# Patient Record
Sex: FEMALE | Race: WHITE | URBAN - METROPOLITAN AREA
[De-identification: names, ages, dates, MRNs, and addresses within clinical notes are randomized per-mention and may not be internally consistent; named-entity substitution may affect disease eponyms.]

---

## 2019-06-12 ENCOUNTER — HOSPITAL ENCOUNTER (INPATIENT)
Age: 74
LOS: 13 days | Discharge: NURSING, REHAB OR LONG-TERM CARE | DRG: 949 | End: 2019-06-25
Attending: PHYSICAL MEDICINE & REHABILITATION | Admitting: PHYSICAL MEDICINE & REHABILITATION
Payer: MEDICARE

## 2019-06-12 DIAGNOSIS — S06.9XAA TRAUMATIC BRAIN INJURY OF UNKNOWN INTENT: Primary | ICD-10-CM

## 2019-06-12 PROCEDURE — 1280000000 HC REHAB R&B

## 2019-06-12 RX ORDER — SODIUM CHLORIDE 1000 MG
2 TABLET, SOLUBLE MISCELLANEOUS
Status: DISCONTINUED | OUTPATIENT
Start: 2019-06-13 | End: 2019-06-12 | Stop reason: SDUPTHER

## 2019-06-12 RX ORDER — METOLAZONE 5 MG/1
5 TABLET ORAL 2 TIMES DAILY
Status: DISCONTINUED | OUTPATIENT
Start: 2019-06-12 | End: 2019-06-13

## 2019-06-12 RX ORDER — ALLOPURINOL 100 MG/1
200 TABLET ORAL 2 TIMES DAILY
Status: DISCONTINUED | OUTPATIENT
Start: 2019-06-12 | End: 2019-06-22

## 2019-06-12 RX ORDER — SODIUM BICARBONATE 650 MG/1
650 TABLET ORAL 2 TIMES DAILY
Status: DISCONTINUED | OUTPATIENT
Start: 2019-06-12 | End: 2019-06-25 | Stop reason: HOSPADM

## 2019-06-12 RX ORDER — DILTIAZEM HYDROCHLORIDE 120 MG/1
120 CAPSULE, COATED, EXTENDED RELEASE ORAL DAILY
Status: DISCONTINUED | OUTPATIENT
Start: 2019-06-13 | End: 2019-06-14

## 2019-06-12 RX ORDER — DIGOXIN 125 MCG
125 TABLET ORAL DAILY
Status: DISCONTINUED | OUTPATIENT
Start: 2019-06-13 | End: 2019-06-25 | Stop reason: HOSPADM

## 2019-06-12 RX ORDER — ACETAMINOPHEN 500 MG
500 TABLET ORAL EVERY 6 HOURS SCHEDULED
Status: DISCONTINUED | OUTPATIENT
Start: 2019-06-13 | End: 2019-06-25 | Stop reason: HOSPADM

## 2019-06-12 RX ORDER — BUMETANIDE 1 MG/1
2 TABLET ORAL 2 TIMES DAILY
Status: DISCONTINUED | OUTPATIENT
Start: 2019-06-12 | End: 2019-06-19

## 2019-06-12 RX ORDER — LANOLIN ALCOHOL/MO/W.PET/CERES
400 CREAM (GRAM) TOPICAL DAILY
Status: DISCONTINUED | OUTPATIENT
Start: 2019-06-13 | End: 2019-06-25 | Stop reason: HOSPADM

## 2019-06-12 RX ORDER — PANTOPRAZOLE SODIUM 40 MG/1
40 TABLET, DELAYED RELEASE ORAL
Status: DISCONTINUED | OUTPATIENT
Start: 2019-06-13 | End: 2019-06-25 | Stop reason: HOSPADM

## 2019-06-12 RX ORDER — SODIUM CHLORIDE 1000 MG
3 TABLET, SOLUBLE MISCELLANEOUS
Status: DISCONTINUED | OUTPATIENT
Start: 2019-06-13 | End: 2019-06-13

## 2019-06-12 RX ORDER — FERROUS SULFATE 325(65) MG
325 TABLET ORAL
Status: DISCONTINUED | OUTPATIENT
Start: 2019-06-13 | End: 2019-06-25 | Stop reason: HOSPADM

## 2019-06-12 RX ORDER — OXYCODONE HYDROCHLORIDE 5 MG/1
5 TABLET ORAL 2 TIMES DAILY
Status: DISCONTINUED | OUTPATIENT
Start: 2019-06-12 | End: 2019-06-25 | Stop reason: HOSPADM

## 2019-06-12 RX ORDER — CHOLECALCIFEROL (VITAMIN D3) 50 MCG
2000 TABLET ORAL DAILY
Status: DISCONTINUED | OUTPATIENT
Start: 2019-06-13 | End: 2019-06-22

## 2019-06-12 RX ORDER — ASCORBIC ACID 500 MG
500 TABLET ORAL DAILY
Status: DISPENSED | OUTPATIENT
Start: 2019-06-13 | End: 2019-06-23

## 2019-06-12 RX ORDER — FUROSEMIDE 40 MG/1
80 TABLET ORAL DAILY
Status: DISCONTINUED | OUTPATIENT
Start: 2019-06-13 | End: 2019-06-12 | Stop reason: SDUPTHER

## 2019-06-12 ASSESSMENT — PAIN DESCRIPTION - PAIN TYPE: TYPE: ACUTE PAIN

## 2019-06-12 ASSESSMENT — PAIN DESCRIPTION - LOCATION: LOCATION: BACK

## 2019-06-12 ASSESSMENT — PAIN SCALES - GENERAL: PAINLEVEL_OUTOF10: 5

## 2019-06-12 ASSESSMENT — PAIN DESCRIPTION - ORIENTATION: ORIENTATION: MID

## 2019-06-12 NOTE — LETTER
[facility] of Necessity  For Non-Emergency Transportation By [employer]    Individual Information:  1. Name (Enter the full name of the individual transported) [facility] 2. [place] Medicaid Billing Number-[id number]      3. Address (Individual's home address) [address] [place] [address] [phone], [id number] [address]         Transportation Provider Information  4. Provider Name (Enter the business name of the transportation provider) [facility]     5. [place] Medicaid Provider Number- 7 digits                6. National Provider Identifier (NPI)-10 digits            Certification:  7. Criteria Jose Villeda each reason why transport is being certified as necessary)     During transport, this individual requires:          []       Medical treatment for continuous                            supervision by an EMT          [x]       The administration or regulation of                         oxygen by another person          []       Supervised protective restraint       8. Period Beginning Date (Enter the first date of the certification: 1/47/55 __________________________________  9. Length Jose Villeda one box to indicate the length of time for which the individual is certified for transport. For certification on a temporary basis, specify the number of calendar days, up to 80. If no time period is indicated, then the certification is valid for the period beginning date only). [x]  Not more than 1 days         []  One year            Additional Information Relevant to Certification  10. Comments or Explanations, If necessary or appropriate:    TRAUMATIC BRAIN INJURY ON 3 L OXYGEN     Certifying Practitioner Information  11. Name of Practitioner (Enter the full name of certifying practitioner)DR. Yvette Adan MD     12. [place] Medicaid Provider Number, if applicable- 7 digits  13.  National Provider Identifier (NPI)- 10 digits [id number]     Signature Information 14. Date of Signature 6/24/19 15. Name of person signing William Watkins     16. Signature and Professional Designation (Persons who, with proper authority or approval, sign on behalf of the certifying practitioner must include the practitioner's name as well as their own signature and designation or job title)      False certification constitutes Medicaid fraud ______________________________________________________________________  This form confirms the certification of one individual for transport by one service provider; certification is not transferrable between individuals or service providers. A photocopy, an electronic copy, or a facsimile transmittal of the completed, signed, and dated certification form is valid as the original for documentation purposes. Completion of this form is required in accordance with Chapter 5160-15 of the [place].     [facility] [id number] (Rev. 7/2015)

## 2019-06-13 PROBLEM — S06.9XAA TRAUMATIC BRAIN INJURY OF UNKNOWN INTENT: Status: ACTIVE | Noted: 2019-06-13

## 2019-06-13 PROCEDURE — 97535 SELF CARE MNGMENT TRAINING: CPT

## 2019-06-13 PROCEDURE — 97162 PT EVAL MOD COMPLEX 30 MIN: CPT

## 2019-06-13 PROCEDURE — 6370000000 HC RX 637 (ALT 250 FOR IP): Performed by: PHYSICAL MEDICINE & REHABILITATION

## 2019-06-13 PROCEDURE — 97110 THERAPEUTIC EXERCISES: CPT

## 2019-06-13 PROCEDURE — 97530 THERAPEUTIC ACTIVITIES: CPT

## 2019-06-13 PROCEDURE — 97166 OT EVAL MOD COMPLEX 45 MIN: CPT

## 2019-06-13 PROCEDURE — 92523 SPEECH SOUND LANG COMPREHEN: CPT

## 2019-06-13 PROCEDURE — 1280000000 HC REHAB R&B

## 2019-06-13 RX ORDER — ASCORBIC ACID 500 MG
500 TABLET ORAL DAILY
Status: ON HOLD | COMMUNITY
Start: 2019-06-13 | End: 2019-06-25

## 2019-06-13 RX ORDER — MAGNESIUM OXIDE 400 MG/1
400 TABLET ORAL DAILY
Status: ON HOLD | COMMUNITY
End: 2019-06-25 | Stop reason: HOSPADM

## 2019-06-13 RX ORDER — ACETAMINOPHEN 325 MG/1
650 TABLET ORAL EVERY 4 HOURS PRN
Status: DISCONTINUED | OUTPATIENT
Start: 2019-06-13 | End: 2019-06-25 | Stop reason: HOSPADM

## 2019-06-13 RX ORDER — OXYCODONE HYDROCHLORIDE 5 MG/1
5 CAPSULE ORAL 2 TIMES DAILY
Status: ON HOLD | COMMUNITY
End: 2019-06-25 | Stop reason: SDUPTHER

## 2019-06-13 RX ORDER — BUMETANIDE 1 MG/1
2 TABLET ORAL 2 TIMES DAILY
COMMUNITY

## 2019-06-13 RX ORDER — PANTOPRAZOLE SODIUM 40 MG/1
40 TABLET, DELAYED RELEASE ORAL DAILY
COMMUNITY

## 2019-06-13 RX ORDER — ACETAMINOPHEN 160 MG
2000 TABLET,DISINTEGRATING ORAL DAILY
COMMUNITY

## 2019-06-13 RX ORDER — SODIUM BICARBONATE 650 MG/1
650 TABLET ORAL 2 TIMES DAILY
COMMUNITY

## 2019-06-13 RX ORDER — ALENDRONATE SODIUM 70 MG/1
70 TABLET ORAL DAILY
Status: ON HOLD | COMMUNITY
End: 2019-06-25 | Stop reason: HOSPADM

## 2019-06-13 RX ORDER — FERROUS SULFATE 325(65) MG
325 TABLET ORAL
COMMUNITY

## 2019-06-13 RX ORDER — ALLOPURINOL 100 MG/1
100 TABLET ORAL 2 TIMES DAILY
COMMUNITY

## 2019-06-13 RX ORDER — DILTIAZEM HYDROCHLORIDE 120 MG/1
120 CAPSULE, COATED, EXTENDED RELEASE ORAL DAILY
Status: ON HOLD | COMMUNITY
End: 2019-06-25 | Stop reason: HOSPADM

## 2019-06-13 RX ORDER — METOLAZONE 5 MG/1
5 TABLET ORAL 2 TIMES DAILY
Status: ON HOLD | COMMUNITY
End: 2019-06-25 | Stop reason: HOSPADM

## 2019-06-13 RX ORDER — DIGOXIN 125 MCG
125 TABLET ORAL DAILY
COMMUNITY

## 2019-06-13 RX ADMIN — ACETAMINOPHEN 500 MG: 500 TABLET ORAL at 11:49

## 2019-06-13 RX ADMIN — SODIUM BICARBONATE 650 MG: 650 TABLET ORAL at 21:03

## 2019-06-13 RX ADMIN — ALLOPURINOL 200 MG: 100 TABLET ORAL at 21:02

## 2019-06-13 RX ADMIN — OXYCODONE HYDROCHLORIDE 5 MG: 5 TABLET ORAL at 00:37

## 2019-06-13 RX ADMIN — ACETAMINOPHEN 500 MG: 500 TABLET ORAL at 18:34

## 2019-06-13 RX ADMIN — OXYCODONE HYDROCHLORIDE 5 MG: 5 TABLET ORAL at 21:02

## 2019-06-13 RX ADMIN — BUMETANIDE 2 MG: 1 TABLET ORAL at 21:02

## 2019-06-13 RX ADMIN — COLLAGENASE SANTYL: 250 OINTMENT TOPICAL at 08:49

## 2019-06-13 RX ADMIN — ACETAMINOPHEN 500 MG: 500 TABLET ORAL at 00:37

## 2019-06-13 ASSESSMENT — PAIN SCALES - GENERAL
PAINLEVEL_OUTOF10: 4
PAINLEVEL_OUTOF10: 5
PAINLEVEL_OUTOF10: 6
PAINLEVEL_OUTOF10: 4
PAINLEVEL_OUTOF10: 7
PAINLEVEL_OUTOF10: 0
PAINLEVEL_OUTOF10: 8
PAINLEVEL_OUTOF10: 5
PAINLEVEL_OUTOF10: 3
PAINLEVEL_OUTOF10: 7

## 2019-06-13 ASSESSMENT — PAIN DESCRIPTION - LOCATION
LOCATION: HEAD

## 2019-06-13 ASSESSMENT — PAIN DESCRIPTION - PAIN TYPE
TYPE: ACUTE PAIN

## 2019-06-13 ASSESSMENT — PAIN DESCRIPTION - FREQUENCY
FREQUENCY: CONTINUOUS
FREQUENCY: INTERMITTENT
FREQUENCY: CONTINUOUS

## 2019-06-13 ASSESSMENT — PAIN DESCRIPTION - ONSET
ONSET: ON-GOING
ONSET: ON-GOING

## 2019-06-13 ASSESSMENT — PAIN DESCRIPTION - DESCRIPTORS
DESCRIPTORS: ACHING;DISCOMFORT
DESCRIPTORS: ACHING;HEADACHE
DESCRIPTORS: ACHING;DISCOMFORT;HEADACHE

## 2019-06-13 ASSESSMENT — PAIN DESCRIPTION - ORIENTATION: ORIENTATION: RIGHT

## 2019-06-13 NOTE — H&P
or  gallops. ABDOMEN:  Bowel sounds normal.  Soft, nontender. No masses or  organomegaly. EXTREMITIES:  Without clubbing, cyanosis or edema. There is a  laceration of the right elbow. MENTAL STATUS:  Alert and oriented to person, partially to place and  time. Sensation is intact. NEUROMUSCULAR:  4/5 strength. PROBLEM LIST:  1. Traumatic brain injury with subdural hematoma. 2.  Myeloproliferative disorder. 3.  Atrial fibrillation, not currently on anticoagulants. 4.  Recurrent need for transfusions. 5.  Chronic diastolic congestive heart failure. Individualized overall plan of care, I think the patient is a good  candidate for further rehab. We will monitor the blood counts. I will  keep her off the anticoagulants right now, even though she does have the  atrial fib. Hopefully, we will see ongoing improvement. She seems to  be interacting well, following commands well, and strength is good. Medical prognosis is good. Rehab prognosis is good. PT will be working on ambulation, transfers, and advanced transfers, an  hour and half, five to seven days a week with goals of modified  independent. OT will be working on upper extremity strengthening,  functioning, ADL skills and basic homemaking an hour and half, five to  seven days a week with goals of modified independent. Speech will be  seeing her for cognition. She will have 24-hour-a-day, seven-day-a-week  rehab nursing to address bowel and bladder issues, carryover from  therapy and safety. Overall length of stay will probably be two to three weeks with the  patient returning home at the end of that time.         Miguel Bird MD    D: 06/13/2019 9:51:02       T: 06/13/2019 9:59:57     [email or web address]  Job#: [id number]     Doc#: [id number]    CC:

## 2019-06-13 NOTE — PROGRESS NOTES
Occupational Therapy   Occupational Therapy Initial Assessment  Date: 2019   Patient Name: Soren Tang  MRN: [id number]     : 1945  Room: 56B    Referring Practitioner: Verónica Ewing MD  Diagnosis: s/p fall- SDH- frontal- laceration R elbow & head  Pertinent Medical History: CHF, GERD, HTN, a-fib, pacemaker, bulging disc, hx gout & spinal stenosis    Date of Service: 2019    Discharge Recommendations:  Home with assist PRN, Home with Home health OT, Home with nursing aide      Restrictions:   Fall, 2L O2 NC     Subjective   Chart Reviewed: Yes  Family / Caregiver Present: No  Subjective: Pt presents supine in bed & was agreeable to OT intervention  Pain Comments: Pt did c/o pain R elbow & forehead R side during OT session    Social/Functional History  Lives With: Alone  Type of Home: Apartment  Home Layout: One level  Home Access: Elevator, Level entry  Bathroom Shower/Tub: (tub with a cut out)  Bathroom Toilet: Standard  Home Equipment: (rollator)  ADL Assistance: Independent  Homemaking Assistance: Independent  Ambulation Assistance: Independent  Transfer Assistance: Independent  IADL Comments: PLOF pt independent in all areas & takes cloths to 2nd floor where laundry is located using her rollator     Objective   Vision: Within Functional Limits  Hearing: Within functional limits      Orientation  Overall Orientation Status: Within Functional Limits(latent)     Observation/Palpation  Posture: Fair     Balance  Sitting Balance: Stand by assistance  Standing Balance: Moderate assistance     ADL  Feeding: Minimal assistance  Grooming: Minimal assistance  UE Bathing: Minimal assistance  LE Bathing: Moderate assistance;Verbal cueing; Increased time to complete  UE Dressing: Minimal assistance  LE Dressing: Maximum assistance;Verbal cueing; Increased time to complete  Toileting: Maximum assistance     Quality of Movement Other  Comment: Arthitic changes noted & decreased  strength noted     Bed mobility  Supine to Sit: Moderate assistance  Scooting: Minimal assistance  Transfers  Sit to stand: Moderate assistance(MIn A from an elevated surface height but Mod A sit>stand from a lower surface height)  Stand to sit: Minimal assistance  Transfer Comments: vc's for handplacement     Vision - Basic Assessment  Prior Vision: No visual deficits  Visual History: No significant visual history  Patient Visual Report: No visual complaint reported.      Cognition  Overall Cognitive Status: Exceptions  Arousal/Alertness: Delayed responses to stimuli  Following Commands: (Pt followed a 2/3 step command)  Problem Solving: Assistance required to generate solutions;Assistance required to identify errors made  Insights: Decreased awareness of deficits  Initiation: Requires cues for some  Sequencing: Requires cues for some     Sensation  Overall Sensation Status:grossly intact to touch      BUE AROM: BUE AROM <3/4 in all planes & incrased time to reach end range  Right & Left Hand AROM: B hand grasp & rellase noted but decreased strengthnoted B'ly    LUE Strength  LUE Strength Comment: BUE- shoulders 3+/5, R elbow NT, L elbow 3+/5 & wrist 3+/5     Hand Dominance: Right    Left Hand Strength -   Handle Setting 2: 25# & norm for pt age & gender is 42#  Right Hand Strength -   Handle Setting 2: 20# & norm for pt age & gender is 50#    Fine Motor Skills  Left 9-Hole Peg Test: (33.2 seconds & norm for pt age & gender is 24.1 seconds)  Right 9-Hole Peg Test: (33.8 seconds & norm for pt age & gender is 22.5 seconds)     Plan   Times per week: OT twice a day for 2-3 weeks to address above problem areas  Times per day: Twice a day  Current Treatment Recommendations: Strengthening, Gait Training, Safety Education & Training, Balance Training, Patient/Caregiver Education & Training, Self-Care / ADL, Functional Mobility Training, Neuromuscular Re-education, Equipment Evaluation, Education, & procurement, Home Management Training, Endurance Training, Cognitive Reorientation    Assessment   Performance deficits / Impairments: Decreased functional mobility ; Decreased cognition;Decreased high-level IADLs;Decreased ADL status; Decreased endurance;Decreased fine motor control;Decreased coordination;Decreased strength;Decreased balance;Decreased safe awareness  Prognosis: Good  Decision Making: Medium Complexity  Patient Education: Pt educated with regards to OT process. Pt participated in OT goal planning. Pt pleasant & cooeprative throughout the OT process. REQUIRES OT FOLLOW UP: Yes  Activity Tolerance: Patient Tolerated treatment well  Safety Devices in place: Yes  Type of devices: Call light within reach      Treatment Initiated: Pt educated with regards to dressing strategies to promote pt independence. Pt educated with regards to proper hand placement & body mechniacs to complete sit<>stands with greater ease.  Pt education to be ongoing to ensure pt carryover    Goals  Long term goals  Time Frame for Long term goals : 2-3 weeks to address above problem areas  Long term goal 1: Pt demo Mod I to eat all meals  Long term goal 2: Pt demo Mod I to perform grooming seated or standing  Long term goal 3: Pt demo Sup bathing @ shower level both seated & standing & demo G- safety & insight  Long term goal 4: Pt demo I UE & Mod I LE dress with AE as needed & demo G- safety & insight  Long term goal 5: Pt demo Mod I commode trf with appropriate DME to ensure pt safety  Long term goals 6: Pt demo Sup cut out shower trf with appropriate DME to ensure pt safety  Long term goal 7: Pt demo Sup light homemaking activity & demo G- safety & insight  Long term goal 8: Pt demo G- endurance for a 20-30 minute functional activity  Long term goal 9: Pt demo improved  srength from R hand 20# & norm for pt age & gender is 50# & L hand 25# norm form pt age & gender is 42# to improve pt ability to complete ADLs with greater independence  Patient Goals   Patient goals : \"Feed myself & get myself dressed. \"     Therapy Time   Individual Concurrent Group Co-treatment   Time In 730-OT eval  745-OT rx         Time Out 745-OT eval  830-OT rx         Minutes 60 Min OT total   15 MIn OT eval  [address]            Adela Gomez, OTR/L [id number]

## 2019-06-13 NOTE — PROGRESS NOTES
Pharmacy Note    Araceli Robertson was ordered Fosamax 70 mg. Per the [address], this medication is non-formulary and not stocked by pharmacy for the reason indicated below. The medication can be reordered at discharge.      Medications in which risks outweigh benefits during hospitalization:         -  oral bisphosphonates         -  raloxifene (Evista)

## 2019-06-13 NOTE — PROGRESS NOTES
Late Entry:  Patient given a copy of The Important Message From Medicare.  Sanjuanita Mendoza  6/13/2019  3:01 PM

## 2019-06-13 NOTE — CARE COORDINATION
Social Work Assessment Summary Verified by Bhavna Gonzalez (daughters)    PCP: Patricia Pimentel    Patient Resides: Pt is  and lives alone. Home Architecture : Pt lives on the third floor of apartment in [facility] . Pt uses elevator. Bedroom and bathroom are all one level, bathroom has a tub/shower combo with shower curtain. Will you return to your own home? Per pt she plans to return home. Per daughter Vivi Trujillo she doesn't feel comfortable with mother returning home and says she will need assistance. Primay Caregiver: Pt's daughter Vivi Beavers(45), who is employed at [employer] full time, She is healthy and drives. Pt has (3) other children Julius James, and Robin Crum who do not live in the area. Jessee Barakat lives in [facility] but is staying in town now. She plans to be here until Wed . next week and will participate in decision making and planning. Level of Function PTA : Pt independent in all areas  driving . Pt used wheeled walker to assist with ambulation. Patient had been in hospital  In [address] [place] [address] for cellulitis and was discharged home 3 days prior to this fall. Employment: Pt retired from [employer]     [address] yes     DME Pta  : Pt has WC, [employer], shower chair, toilet seat elevator, lift chair, and bed step. Community Agency Involvement PTA : Pt has an [facility] RN 2x a week from [facility] located in [place], [place]. She has hx of blood cancer and receives care from  [facility], Dr. Gustavo Portillo at [facility].     Do they have a medical profile: No    Family Teaching:  To be scheduled    Strength: \"Very stubborn and sweet\" Abena Grace    Preference: :\" Getting her strength back\" -Olayinka Kerr HOME # WORK # CELL #   [place] Grand Daughter   [phone]   Marlin Spruce Head Daughter   [phone]   Marny Kehr Son   [phone]     Height: 5'3  Weight: 146 lbs    ELISA Juarez  6/13/2019

## 2019-06-13 NOTE — PROGRESS NOTES
Occupational Therapy  OCCUPATIONAL THERAPY DAILY NOTE    Date:2019  Patient Name: Yanique Key  MRN: [id number]  : 1945  Room: [address] [place] [address]     Diagnosis: S/P Fall with SDH, Lacerations to Head and R Elbow     Precautions: falls    Functional Assessment:   Date Status AE  Comments   Feeding 19  Minimal Assist      Grooming 19  Minimal Assist      Bathing 19  Moderate Assist      UB Dressing 19  Minimal Assist      LB Dressing 19  Maximal Assist      Homemaking 19  TBA        Functional Transfers / Balance:   Date Status DME  Comments   Sit Balance 19  Supervision      Stand Balance 19  Minimal Assist      [] Tub  [] Shower   Transfer 19  TBA     Commode   Transfer 19 Moderate Assist      Functional   Mobility 19  Minimal Assist  ww    Other:         Functional Exercises / Activity:   ROM exercises to BUE's with ROM shoulder arc    AROM and graded strength exercises with wheel and medium resistive theraputty on table top surface   B hand strength with 5 # digi flex 3 sets 10 reps     Sensory / Neuromuscular Re-Education:      Cognitive Skills:   Status Comments   Problem   Solving fair     Memory fair     Sequencing fair     Safety fair       Visual Perception:    Education:   pt was educated on safe functional transfers     [] Family teach completed on:    Pain Level: back pain from arthritis     Additional Notes:       Patient has made fair  progress during treatment sessions toward set goals. Therapy emphasis to obtain goals:Current Treatment Recommendations: Strengthening, Gait Training, Safety Education & Training, Balance Training, Patient/Caregiver Education & Training, Self-Care / ADL, Functional Mobility Training, Neuromuscular Re-education, Equipment Evaluation, Education, & procurement, Home Management Training, Endurance Training, Cognitive Reorientation          [x] Continue with current OT Plan of care.   [] Prepare for Discharge     DISCHARGE RECOMMENDATIONS  Recommended DME:    Post Discharge Care:   []Home Independently  []Home with 24hr Care / Supervision [x]Home with Partial Supervision []Home with Home Health OT []Home with Out Pt OT []Other: ___   Comments:         Time in Time out Tx Time Breakdown  Variance:   First Session    [] Individual Tx-   [] Concurrent Tx -  [] Co-Tx -   [] Group Tx -   [] Time Missed -     Second Session 1:15 1:45 [] Individual Tx-   [x] Concurrent Tx -30  [] Co-Tx -   [] Group Tx -   [] Time Missed -     Third Session    [] Individual Tx-   [] Concurrent Tx -  [] Co-Tx -   [] Group Tx -   [] Time Missed -         Total Tx Time :30 mins        Patrice Muñoz OTR/L [id number]

## 2019-06-13 NOTE — PROGRESS NOTES
Physical Therapy    Facility/Department: [facility] REHAB  Initial Assessment    NAME: Jong Mccormick  : 1945  MRN: [id number]    Date of Service: 2019    Evaluating Therapist: Kaiden Camargo DPT    ROOM: [facility]-  DIAGNOSIS: TBI  PMH: Pt found down in residence on 19 with facial wound. CT of head demonstrated L SDH with shift (from assumed fall). L frontal facial laceration s/p repair noted during hospitalization. Intubated upon arrival to hospital. PMH includes myelofibrosis, CHF, AFIB, GERG, pacemaker, CHF. PRECAUTIONS: fluid restriction, falls, dysphagia     Social:  Pt lives alone in a single floor Sr. high rise/independent living facility (3rd floor apartment with elevator access). PTA, pt used a rollator for mobility, w/c, ww, lift chair. [facility] RN 2x a week from [facility] located in [place], [place]     Initial Evaluation  19        Short Term Goals Long Term Goals    Was pt agreeable to Eval/treatment? yes       Does pt have pain?  C/o mild HA/ R arm pain       Bed Mobility  Rolling: sba  Supine to sit: cg/sba  Sit to supine: Felipe  Scooting: sba   sup Mod I   Transfers Sit to stand: cgA  Stand to sit: cgA  Stand pivot: cgA with ww   sba with AAD Mod I with AAD   Ambulation    30 feet with ww with cg/sba   150 feet with AAD with sup >300 feet with AAD with mod I   Walking 10 feet on uneven surface NT       Wheel Chair Mobility NT       Car Transfers NT   sup Mod I   Stair negotiation: ascended and descended  1 steps with bilateral rail with Felipe   4 steps with bilateral rail with sba 4 steps with bilateral rail with sup   Curb Step:   ascended and descended 4 inch step with ww and Felipe   4 inch step with AAD and sba 4 inch step with AAD and sup   Picking up object off the floor NT       BLE ROM WFl       BLE Strength Bilateral Hips 3+/5, knees/ankles 4/5       Balance  Sitting: sup  Standing: cgA with ww       Date Family Teach Completed TBA (dtr present on eval)       Is additional Family Teaching Reinforcement      Rehab potential is Good for reaching above PT goals. Pts/ family goals   1. To get stronger    Patient and or family understand(s) diagnosis, prognosis, and plan of care. PLAN  PT care will be provided in accordance with the objectives noted above. Whenever appropriate, clear delegation orders will be provided for nursing staff. Exercises and functional mobility practice will be used as well as appropriate assistive devices or modalities to obtain goals. Patient and family education will also be administered as needed. Frequency of treatments will be 2x/day M-F and 1x/day Sat or Sun x  7-10 days.     Time in: 1345  Time out: [address]   [id number]

## 2019-06-13 NOTE — PROGRESS NOTES
Speech Language Pathology   ASSESSMENT OF SPEECH, LANGUAGE, & COGNITION    [x] The admitting diagnosis and active problem list as listed below have been reviewed prior to the initiation of this evaluation. Traumatic brain injury of unknown intent [facility]) [S06.9X9A]     Patient Active Problem List   Diagnosis    Traumatic brain injury of unknown intent ([facility] [address]       SPEECH PATHOLOGY DIAGNOSIS:    Mild-moderate cognitive deficit  Mild auditory processing deficit    Self-awareness of cognitive linguistic deficits:   [] No awareness   [] Limited awareness (minimal appreciation without specificity)   [x] Situational awareness (recognition of problem in context, in real time)   [] Predictive awareness (able to predict problem, impact of implications)    THERAPY RECOMMENDATIONS:     Speech Pathology intervention is recommended with emphasis on the following:    Improve STM recall, sequencing and problem solving for increased functional independence with completion of ADLs/IADLs to a level commensurate with supervision. Improve verbal/motor response times with execution of multiple step directions during functionally based activities (to a level perceived to be appropriate given the environmental context).                     OBJECTIVE ASSESSMENT:     Mental status:      Alert          X Responsive          X Cooperative          X   Uncooperative             Aphasic              Confused       Impulsive     TBA Unresponsive             EVALUATION  RESULTS SHORT TERM   FIM GOAL LONG TERM   FIM GOAL     RECEPTIVE   LANGUAGE     AUDITORY/SPOKEN LANGUAGE COMPREHENSION   SUPERVISION     MODIFIED INDEPENDENT     Reliable response to   yes/no questions  Abstract probes      Accurate response to   Wh- questions Latent but accurate response to intermediate level queries      Follow commands   Latent, 3 step      Single object identification   Intact      Understand conversational speech Yes given increased time      WRITTEN

## 2019-06-13 NOTE — PROGRESS NOTES
On first attempt with medications patient requested pills one at a time. Patient spit out stating she could not swallow. Attempted pills with applesauce one at a time patient pocketed and could not swallow. Some meds attempted crushed, patient would not swallow. SLP was walking by. Patient could swallow water but could not initiate a swallow to take meds. All medications were spit out and discarded. Will speak with attending. Continue to radha.

## 2019-06-14 LAB
ANION GAP SERPL CALCULATED.3IONS-SCNC: 13 MMOL/L (ref 7–16)
ANISOCYTOSIS: ABNORMAL
BASOPHILS ABSOLUTE: 0 E9/L (ref 0–0.2)
BASOPHILS RELATIVE PERCENT: 0 % (ref 0–2)
BUN BLDV-MCNC: 50 MG/DL (ref 8–23)
CALCIUM SERPL-MCNC: 8.3 MG/DL (ref 8.6–10.2)
CHLORIDE BLD-SCNC: 94 MMOL/L (ref 98–107)
CO2: 27 MMOL/L (ref 22–29)
CREAT SERPL-MCNC: 1 MG/DL (ref 0.5–1)
EOSINOPHILS ABSOLUTE: 0 E9/L (ref 0.05–0.5)
EOSINOPHILS RELATIVE PERCENT: 0 % (ref 0–6)
GFR AFRICAN AMERICAN: >60
GFR NON-AFRICAN AMERICAN: 54 ML/MIN/1.73
GLUCOSE BLD-MCNC: 103 MG/DL (ref 74–99)
HCT VFR BLD CALC: 23.6 % (ref 34–48)
HEMOGLOBIN: 7.5 G/DL (ref 11.5–15.5)
LYMPHOCYTES ABSOLUTE: 2.9 E9/L (ref 1.5–4)
LYMPHOCYTES RELATIVE PERCENT: 13 % (ref 20–42)
MCH RBC QN AUTO: 28.4 PG (ref 26–35)
MCHC RBC AUTO-ENTMCNC: 31.8 % (ref 32–34.5)
MCV RBC AUTO: 89.4 FL (ref 80–99.9)
METAMYELOCYTES RELATIVE PERCENT: 14 % (ref 0–1)
MONOCYTES ABSOLUTE: 0.45 E9/L (ref 0.1–0.95)
MONOCYTES RELATIVE PERCENT: 2 % (ref 2–12)
MYELOCYTE PERCENT: 1 % (ref 0–0)
NEUTROPHILS ABSOLUTE: 18.73 E9/L (ref 1.8–7.3)
NEUTROPHILS RELATIVE PERCENT: 69 % (ref 43–80)
NUCLEATED RED BLOOD CELLS: 1 /100 WBC
PDW BLD-RTO: 16.6 FL (ref 11.5–15)
PLATELET # BLD: 780 E9/L (ref 130–450)
PMV BLD AUTO: 11.9 FL (ref 7–12)
POIKILOCYTES: ABNORMAL
POLYCHROMASIA: ABNORMAL
POTASSIUM REFLEX MAGNESIUM: 3.7 MMOL/L (ref 3.5–5)
PROMYELOCYTES PERCENT: 1 % (ref 0–0)
RBC # BLD: 2.64 E12/L (ref 3.5–5.5)
SODIUM BLD-SCNC: 134 MMOL/L (ref 132–146)
SPHEROCYTES: ABNORMAL
WBC # BLD: 22.3 E9/L (ref 4.5–11.5)

## 2019-06-14 PROCEDURE — 80048 BASIC METABOLIC PNL TOTAL CA: CPT

## 2019-06-14 PROCEDURE — 97530 THERAPEUTIC ACTIVITIES: CPT

## 2019-06-14 PROCEDURE — 36415 COLL VENOUS BLD VENIPUNCTURE: CPT

## 2019-06-14 PROCEDURE — 97127 HC SP THER IVNTJ W/FOCUS COG FUNCJ: CPT

## 2019-06-14 PROCEDURE — 92610 EVALUATE SWALLOWING FUNCTION: CPT

## 2019-06-14 PROCEDURE — 1280000000 HC REHAB R&B

## 2019-06-14 PROCEDURE — 97110 THERAPEUTIC EXERCISES: CPT

## 2019-06-14 PROCEDURE — 6370000000 HC RX 637 (ALT 250 FOR IP): Performed by: PHYSICAL MEDICINE & REHABILITATION

## 2019-06-14 PROCEDURE — 85025 COMPLETE CBC W/AUTO DIFF WBC: CPT

## 2019-06-14 PROCEDURE — 97535 SELF CARE MNGMENT TRAINING: CPT

## 2019-06-14 RX ORDER — DILTIAZEM HYDROCHLORIDE 180 MG/1
180 CAPSULE, COATED, EXTENDED RELEASE ORAL DAILY
Status: DISCONTINUED | OUTPATIENT
Start: 2019-06-14 | End: 2019-06-25 | Stop reason: HOSPADM

## 2019-06-14 RX ADMIN — BUMETANIDE 2 MG: 1 TABLET ORAL at 09:20

## 2019-06-14 RX ADMIN — DIGOXIN 125 MCG: 125 TABLET ORAL at 09:20

## 2019-06-14 RX ADMIN — COLLAGENASE SANTYL: 250 OINTMENT TOPICAL at 09:16

## 2019-06-14 RX ADMIN — OXYCODONE HYDROCHLORIDE 5 MG: 5 TABLET ORAL at 09:19

## 2019-06-14 RX ADMIN — DILTIAZEM HYDROCHLORIDE 180 MG: 180 CAPSULE, EXTENDED RELEASE ORAL at 09:20

## 2019-06-14 RX ADMIN — OXYCODONE HYDROCHLORIDE 5 MG: 5 TABLET ORAL at 20:43

## 2019-06-14 RX ADMIN — SODIUM BICARBONATE 650 MG: 650 TABLET ORAL at 09:20

## 2019-06-14 RX ADMIN — MAGNESIUM GLUCONATE 500 MG ORAL TABLET 400 MG: 500 TABLET ORAL at 09:20

## 2019-06-14 RX ADMIN — ALLOPURINOL 200 MG: 100 TABLET ORAL at 09:19

## 2019-06-14 RX ADMIN — FERROUS SULFATE TAB 325 MG (65 MG ELEMENTAL FE) 325 MG: 325 (65 FE) TAB at 09:20

## 2019-06-14 ASSESSMENT — PAIN DESCRIPTION - PAIN TYPE: TYPE: ACUTE PAIN

## 2019-06-14 ASSESSMENT — PAIN SCALES - GENERAL
PAINLEVEL_OUTOF10: 5
PAINLEVEL_OUTOF10: 0
PAINLEVEL_OUTOF10: 9
PAINLEVEL_OUTOF10: 10

## 2019-06-14 ASSESSMENT — PAIN DESCRIPTION - PROGRESSION: CLINICAL_PROGRESSION: NOT CHANGED

## 2019-06-14 ASSESSMENT — PAIN DESCRIPTION - LOCATION
LOCATION: BACK
LOCATION: BACK

## 2019-06-14 ASSESSMENT — PAIN DESCRIPTION - ONSET: ONSET: ON-GOING

## 2019-06-14 ASSESSMENT — PAIN DESCRIPTION - DESCRIPTORS: DESCRIPTORS: ACHING;CONSTANT;DISCOMFORT

## 2019-06-14 ASSESSMENT — PAIN - FUNCTIONAL ASSESSMENT: PAIN_FUNCTIONAL_ASSESSMENT: PREVENTS OR INTERFERES SOME ACTIVE ACTIVITIES AND ADLS

## 2019-06-14 ASSESSMENT — PAIN DESCRIPTION - FREQUENCY: FREQUENCY: CONTINUOUS

## 2019-06-14 NOTE — PROGRESS NOTES
Speech Language Pathology  ACUTE REHABILITATION -- DAILY PROGRESS NOTE                 SWALLOWING:  Current level: MODIFIED INDEPENDENT    Diet: Regular consistency solids and thin consistency liquids      LANGUAGE:        RECEPTIVE:  Current FIM level: SUPERVISION      Short term FIM goal:        Long term FIM goal: MODIFIED INDEPENDENT         EXPRESSIVE:   Current FIM level: SUPERVISION      Short term FIM goal:       Long term FIM goal:  MODIFIED INDEPENDENT    Pt expressed wants and needs appropriately. COGNITION:       PROBLEM SOLVING: Current FIM level: MIN ASSISTANCE/MOD ASSISTANCE      Short term FIM goal: MIN ASSISTANCE      Long term FIM goal: SUPERVISION         MEMORY:    Current FIM level: MIN ASSISTANCE/MOD ASSISTANCE      Short term FIM goal:  MIN ASSISTANCE      Long term FIM goal:  SUPERVISION       SAFETY/INSIGHT: Fair for all observed tasks. Pt oriented to person, place, month, and year with 100% accuracy and no cueing. Pt completed a money problem solving task with good ability and min cueing. Pt able to complete simple math in her head without use of a caculator. Education provided on money management while living at home, and pt agreed to education. Poor short term memory was noted during task due to multiple requests for repetition. SPEECH INTELLIGIBILITY:  Good intelligibility for conversational speech.        Bibi Flores  Speech-Language Pathology Student Intern        Three Hour Rule Tracking:    Individual therapy:            30 minutes 9:30-10:00  Concurrent therapy:  0 minutes  Group therapy:   0 minutes  Co-treatment therapy:  0  minutes    Total minutes: 30 minutes

## 2019-06-14 NOTE — PROGRESS NOTES
Occupational Therapy  OCCUPATIONAL THERAPY DAILY NOTE    Date:2019  Patient Name: Araceli Robertson  MRN: [id number]  : 1945  Room: [address] [place] [address]     Diagnosis: S/P Fall with SDH, Lacerations to Head and R Elbow     Precautions: falls, O2 2 liters     Functional Assessment:   Date Status AE  Comments   Feeding 19  Minimal Assist      Grooming 19  Minimal Assist      Bathing 19  Moderate Assist      UB Dressing 19  Minimal Assist      LB Dressing 19  Moderate Assist Reacher  Pt donned hospital pants using reacher   Homemaking 19  TBA        Functional Transfers / Balance:   Date Status DME  Comments   Sit Balance 19  Supervision      Stand Balance 19  Minimal Assist      [] Tub  [] Shower   Transfer 19  TBA     Commode   Transfer    toileting  19   Minimal Assist       Moderate Assist  Grab rail        Pt completed hygiene following voiding. Pt needed assist to pull pants up and down    Functional   Mobility 19  Minimal Assist  ww Short distance with several rest breaks due to fatigue    Other:  Sit<>stand    19   MIN A    HHA   Pt requiring assist to maintain balance v/c's for safety and hand placement          Functional Exercises / Activity:  Pt used reacher to  items off of floor in preparation for using the reacher for LB dressing   B hand strength with 5 # digi flex 3 sets 10 reps       Pt completed static standing at table being able to stand for ~5 minutes focusing on standing endurance to increased independence with ADL tasks; Pt completed B UE ex's with min resistant can do bar 1 x 15 reps in 3 planes;   Yellow theraputty with wheel rolling out putty, stretching with B UE hands; Therapeutic ex's complted focusing on UE strength to increase independence with ADL tasks;      Sensory / Neuromuscular Re-Education:      Cognitive Skills:   Status Comments   Problem   Solving fair     Memory fair     Sequencing fair Safety fair       Visual Perception:    Education:  Pt was educated on AE for LB dressing   Pt was educated on pacing techniques to utilize during ADL's     [] Family teach completed on:    Pain Level: back pain from arthritis     Additional Notes:   Pt had complaints of feeling tired and winded. O2 level on room air at 87 %. Applied O2 at 2 liters and levels increased to 97%     Patient has made fair  progress during treatment sessions toward set goals. Therapy emphasis to obtain goals:Current Treatment Recommendations: Strengthening, Gait Training, Safety Education & Training, Balance Training, Patient/Caregiver Education & Training, Self-Care / ADL, Functional Mobility Training, Neuromuscular Re-education, Equipment Evaluation, Education, & procurement, Home Management Training, Endurance Training, Cognitive Reorientation          [x] Continue with current OT Plan of care.   [] Prepare for Discharge     DISCHARGE RECOMMENDATIONS  Recommended DME:    Post Discharge Care:   []Home Independently  []Home with 24hr Care / Supervision [x]Home with Partial Supervision []Home with Home Health OT []Home with Out Pt OT []Other: ___   Comments:         Time in Time out Tx Time Breakdown  Variance:   First Session  10:45 11:30  [] Individual Tx-   [x] Concurrent Tx -45  [] Co-Tx -   [] Group Tx -   [] Time Missed -     Second Session [id number] [] Individual Tx-   [x] Concurrent Tx -45 min  [] Co-Tx -   [] Group Tx -   [] Time Missed -     Third Session    [] Individual Tx-   [] Concurrent Tx -  [] Co-Tx -   [] Group Tx -   [] Time Missed -         Total Tx Time : 10[address] [facility] OTR/L 82 [facility]  GRANT/L [id number]

## 2019-06-14 NOTE — PROGRESS NOTES
Speech Language Pathology       CLINICAL SWALLOW EVALUATION       [x] The admitting diagnosis and active problem list as listed below have been reviewed prior to the initiation of this evaluation. Traumatic brain injury of unknown intent [facility]) [S06.9X9A]     Patient Active Problem List   Diagnosis    Traumatic brain injury of unknown intent ([facility] [address]       DYSPHAGIA DIAGNOSIS:     Functional mastication and deglutition for prescribed solid and liquid consistencies  Swallow WFL per most recent MBSS (completed 6/6)     DIET RECOMMENDATIONS:    Regular consistency solids with thin consistency liquids.         THERAPY RECOMMENDATIONS:    [x] Therapy is not recommended     [] Ongoing mealtime assessment of patient's tolerance of prescribed diet and/or liquid consistencies is recommended    [] Therapy is recommended to:      [] Improve oral motor strength/ROM    [] Improve tongue base retraction    [] Improve laryngeal strength/ROM    [] Other:     [] Modified Barium Swallow Study (MBSS) is recommended and requires a physician order    [] Malnutrition indicators have been identified, nursing advised to consult dietitian      FEEDING RECOMMENDATIONS:     []  Supervision with all PO intake   [x]  Eat in upright position        [x]  Small bites/sips      [x]  Alternate solids / liquids            []  Check for oral pocketing    []  Place food on left side     []  Place food on right side     []  Spoon sip liquids   []  Liquids via cup administration   []  No straw    []  Double swallow       []  Multiple swallow        [] Throat clear/swallow   [] Effortful swallow    [] Aramis Bower water protocol     [] Modified Joy water protocol    Medication administration recommendations:      [] Administer meds as per patient preference    [x] Administer meds:      [] whole      [x] crushed       With:      [] water        [x] applesauce        [x] pudding      [] Administer meds via feeding tube     [] Other:                       OBJECTIVE ASSESSMENT:    Current respiratory status:        [x] Room Air         [] Nasal cannula        [] O2 mask          [] Non-rebreather mask      [] Tracheostomy      [] BiPAP      [] Vent dependent        Current nutritional status:        [x] Oral      [] NPO    Cognitive status:            [] Intact        [x] Functional           [] Confusion            [] Aphasia      [] Cognitive deficits    Oral mechanism examination:       [x] Adequate lingual/labial strength    [] Generalized oral weakness      [] Left labiobuccal weakness         [] Left lingual deviation          [] Right labiobuccal weakness      [] Right lingual deviation                      [] Oral apraxia                   [] CNT         Vocal quality prior to PO intake:       [x] WFL    [] Weak    [] Wet      PROCEDURE    Consistencies Administered During the Evaluation:      Solids:  [x] Puree/pudding      [x] Soft solid    [x] Solid       Liquids: [x]  Thin         []  Nectar         []  Honey         Mealtime assessment:          [] Breakfast       [x] Lunch    [] Dinner      Method of Intake:     [x] Tsp     [x] Cup      [] Straw     [x] Fed self    [] Fed by SLP      Position:    [x] Seated in wheelchair/chair      [] Upright seated in bed   [] Reclined,  >____°       [] Modified Blue Dye tracheostomy protocol utilized      RESULTS OF ASSESSMENT    Oral Stage:  [] Normal     [x] Functional     [] Abnormal       [] Inadequate labial seal resulting anterior labial spillage from:     [] right     [] left     [] midline     [] Decreased mastication due to:       [] poor/missing dentition     [] decreased lingual control     [] vertical munch     [] other:       [] Delayed A-P transit due to:       [] decreased initiation     [] poor tongue movement     [] cognitive function      [] Oral residuals:        [] throughout oral cavity      [] anterior sulcus     [] left lateral sulcus       [] right lateral sulcus     [] other:                       Comments:        Pharyngeal Stage:  [] Normal      [x] Functional      [] Abnormal     [x]  Although no OVERT clinical signs/symptoms of aspiration were present during this evaluation, silent aspiration cannot be definitively ruled out during a clinical swallow evalution. **If silent aspiration is suspected, a Modified Barium Swallow Study (MBSS) is recommended and requires a physician order. Salient observations: Thin consistency Nectar consistency Honey consistency Puree consistency Solid consistency   redirective throat clear        immediate redirective cough        latent redirective cough        wet respirations        wet vocal quality        multiple swallows             [] Consistent O2  desaturation present following the swallow     [] Eye watering and/or flushing of skin apparent     [] Congested cough throughout evaluation     [] Absent swallow    Comments: With utilization of modified blue dye tracheostomy protocol:    Upon suctioning, blue dye was:        [] present in secretions      [] absent from secretions     Comments:          EDUCATION/GOAL PLANNING    Education completed with:      [x] patient      [] family      Regarding:      [x] diagnosis      [] treatment      [x] prognosis      [x] plan of care    [] Patient was an active participant in goal planning process. [] Patient was unable to participate in goal planning process. [x] Goal planning not appropriate at this time as intervention was not recommended.        Prognosis for improvements is    [] good          [] fair       [] guarded       [] poor        Shari Mazariegos., CCC-SLP/L  SP [id number]  6/14/2019

## 2019-06-14 NOTE — FLOWSHEET NOTE
Inpatient Wound Care(initial evaluation) 5515    Admit Date: 6/12/2019 10:10 PM    Reason for consult:  Right elbow, forehead    Wound history:  Admitted with wound    Findings:     06/14/19 1400   Skin Integrity   Skin Integrity Bruising  (dried scab, edematous)   Location right forehead   Skin Integrity Site 2   Skin Integrity Location 2 Bruising  (dried scabs)   Location 2 BUE   Skin Integrity Site 3   Skin Integrity Location 3 Bruising    Location 3   (abdomen, inner buttocks, back, face, samaria-orbital)   Skin Integrity Site 4   Skin Integrity Location 4   (dried scab)   Location 4 right forehead   Wound 06/14/19 Elbow Right   Date First Assessed/Time First Assessed: 06/14/19 1400   Present on Hospital Admission: Yes  Location: (!) Elbow  Wound Location Orientation: Right   Dressing Changed Changed/New  (cover dressing changed)   Dressing/Treatment Non adherent;Dry Dressing   Wound Length (cm) 3 cm   Wound Width (cm) 3 cm   Wound Depth (cm) 0.1 cm   Wound Surface Area (cm^2) 9 cm^2   Wound Volume (cm^3) 0.9 cm^3   Wound Assessment   (versatel one- not lifted due to fragility of skin)   Drainage Amount Scant   Drainage Description Serosanguinous   Odor None   Samaria-wound Assessment Fragile   Non-staged Wound Description Partial thickness   Yellow%Wound Bed   (versatel not lifted)   legs and feet swollen    Plan:   Will need continued preventative care  versatel one right elbow  Tubi - size F applied    Christina Manuel 6/14/2019 2:55 PM

## 2019-06-14 NOTE — PROGRESS NOTES
Wendy Toscano is a 68 y.o. female patient.     Current Facility-Administered Medications   Medication Dose Route Frequency Provider Last Rate Last Dose    acetaminophen (TYLENOL) tablet 650 mg  650 mg Oral Q4H PRN Simba Fallon MD        magnesium hydroxide (MILK OF MAGNESIA) 400 MG/5ML suspension 30 mL  30 mL Oral Daily PRN Simba Fallon MD        acetaminophen (TYLENOL) tablet 500 mg  500 mg Oral 4 times per day Simba Fallon MD   500 mg at 06/13/19 1834    allopurinol (ZYLOPRIM) tablet 200 mg  200 mg Oral BID Simba Fallon MD   200 mg at 06/13/19 2102    vitamin C (ASCORBIC ACID) tablet 500 mg  500 mg Oral Daily Bhavik Váqzuez MD        bumetanide (BUMEX) tablet 2 mg  2 mg Oral BID Simba Fallon MD   2 mg at 06/13/19 2102    vitamin D tablet 2,000 Units  2,000 Units Oral Daily Simba Fallon MD        collagenase ointment   Topical Daily Simba Fallon MD        digoxin (LANOXIN) tablet 125 mcg  125 mcg Oral Daily Simba Fallon MD        diltiazem (CARDIZEM CD) extended release capsule 120 mg  120 mg Oral Daily Bhavik Vázquez MD        ferrous sulfate tablet 325 mg  325 mg Oral Daily with breakfast Simba Fallon MD        magnesium oxide (MAG-OX) tablet 400 mg  400 mg Oral Daily Simba Fallon MD        oxyCODONE (ROXICODONE) immediate release tablet 5 mg  5 mg Oral BID Simba Fallon MD   5 mg at 06/13/19 2102    pantoprazole (PROTONIX) tablet 40 mg  40 mg Oral QAM AC Bhavik Vázquez MD        phenol 1.4 % mouth spray 1 spray  1 spray Mouth/Throat Q1H PRN Simba Fallon MD        sodium bicarbonate tablet 650 mg  650 mg Oral BID Simba Fallon MD   650 mg at 06/13/19 2103    sodium chloride (OCEAN, BABY AYR) 0.65 % nasal spray 1 spray  1 spray Each Nostril 4x Daily PRN Simba Fallon MD        ruxolitinib phosphate (JAKAFI) 10 MG tablet TABS 10 mg  10 mg Oral BID Simba Fallon MD   10 mg at 06/13/19 2148     Allergies   Allergen Reactions    Metoprolol Tartrate [Metoprolol]      Active Problems:    Traumatic brain injury of unknown intent [facility])  Resolved Problems:    * No resolved hospital problems. *    Blood pressure (!) 130/54, pulse 112, temperature 97.5 °F (36.4 °C), resp. rate 16, height 5' 3\" (1.6 m), weight 146 lb 12.8 oz (66.6 kg), SpO2 96 %. Subjective:  Symptoms:  Stable. She reports weakness. Diet:  Adequate intake. Activity level: Impaired due to weakness. Pain:  She complains of pain that is mild. Objective:  General Appearance: In no acute distress. Vital signs: (most recent): Blood pressure (!) 130/54, pulse 112, temperature 97.5 °F (36.4 °C), resp. rate 16, height 5' 3\" (1.6 m), weight 146 lb 12.8 oz (66.6 kg), SpO2 96 %. Vital signs are normal.    Output: Producing urine and producing stool. Lungs:  Normal effort and normal respiratory rate. Breath sounds clear to auscultation. Heart: Tachycardia. Irregular rhythm. S1 normal.    Abdomen: Abdomen is soft. Bowel sounds are normal.   There is no abdominal tenderness. Extremities: Normal range of motion. Neurological: Patient is alert. (4-/5 L). Assessment:      Date   Status   AE    Comments     Feeding   6/13/19    Minimal Assist              Grooming   6/13/19    Minimal Assist              Bathing   6/13/19    Moderate Assist              UB Dressing   6/13/19    Minimal Assist              LB Dressing   6/13/19    Maximal Assist              Homemaking   6/13/19    TBA                   Functional Transfers / Balance:      Date Status DME  Comments   Sit Balance 6/13/19  Supervision        Stand Balance 6/13/19  Minimal Assist        [] Tub  [] Shower   Transfer 6/13/19  TBA       Commode   Transfer 6/13/19 Moderate Assist        Functional   Mobility 6/13/19  Minimal Assist  ww     Other:                    Assessment:    Condition: In stable condition. Improving.   (TBI   a fib). Plan:   Encourage ambulation.   (Daughter's wedding is tomorrow  I may let her go at a wheelchair level briefly  We will try some family instructions  She is tachycardic with the atrial fib  I am going to increase the Cardizem dose  She will stay off anticoagulants).        Rohan López MD  6/14/2019

## 2019-06-15 LAB
HCT VFR BLD CALC: 23.7 % (ref 34–48)
HEMOGLOBIN: 7.6 G/DL (ref 11.5–15.5)
MCH RBC QN AUTO: 28.8 PG (ref 26–35)
MCHC RBC AUTO-ENTMCNC: 32.1 % (ref 32–34.5)
MCV RBC AUTO: 89.8 FL (ref 80–99.9)
PDW BLD-RTO: 16.4 FL (ref 11.5–15)
PLATELET # BLD: 739 E9/L (ref 130–450)
PMV BLD AUTO: 11.7 FL (ref 7–12)
RBC # BLD: 2.64 E12/L (ref 3.5–5.5)
WBC # BLD: 20 E9/L (ref 4.5–11.5)

## 2019-06-15 PROCEDURE — 6370000000 HC RX 637 (ALT 250 FOR IP): Performed by: PHYSICAL MEDICINE & REHABILITATION

## 2019-06-15 PROCEDURE — 97530 THERAPEUTIC ACTIVITIES: CPT

## 2019-06-15 PROCEDURE — 1280000000 HC REHAB R&B

## 2019-06-15 PROCEDURE — 36415 COLL VENOUS BLD VENIPUNCTURE: CPT

## 2019-06-15 PROCEDURE — 97127 HC SP THER IVNTJ W/FOCUS COG FUNCJ: CPT | Performed by: SPEECH-LANGUAGE PATHOLOGIST

## 2019-06-15 PROCEDURE — 85027 COMPLETE CBC AUTOMATED: CPT

## 2019-06-15 RX ADMIN — OXYCODONE HYDROCHLORIDE 5 MG: 5 TABLET ORAL at 10:41

## 2019-06-15 RX ADMIN — DIGOXIN 125 MCG: 125 TABLET ORAL at 10:38

## 2019-06-15 RX ADMIN — BUMETANIDE 2 MG: 1 TABLET ORAL at 21:26

## 2019-06-15 RX ADMIN — OXYCODONE HYDROCHLORIDE 5 MG: 5 TABLET ORAL at 21:26

## 2019-06-15 RX ADMIN — COLLAGENASE SANTYL: 250 OINTMENT TOPICAL at 10:42

## 2019-06-15 RX ADMIN — DILTIAZEM HYDROCHLORIDE 180 MG: 180 CAPSULE, EXTENDED RELEASE ORAL at 10:39

## 2019-06-15 RX ADMIN — BUMETANIDE 2 MG: 1 TABLET ORAL at 10:37

## 2019-06-15 ASSESSMENT — PAIN DESCRIPTION - DESCRIPTORS: DESCRIPTORS: ACHING;DISCOMFORT

## 2019-06-15 ASSESSMENT — PAIN SCALES - GENERAL
PAINLEVEL_OUTOF10: 8
PAINLEVEL_OUTOF10: 1
PAINLEVEL_OUTOF10: 0
PAINLEVEL_OUTOF10: 5
PAINLEVEL_OUTOF10: 0
PAINLEVEL_OUTOF10: 0

## 2019-06-15 ASSESSMENT — PAIN DESCRIPTION - LOCATION: LOCATION: BACK

## 2019-06-15 ASSESSMENT — PAIN DESCRIPTION - ORIENTATION: ORIENTATION: LOWER

## 2019-06-15 ASSESSMENT — PAIN DESCRIPTION - PAIN TYPE: TYPE: CHRONIC PAIN

## 2019-06-15 NOTE — PROGRESS NOTES
Speech Language Pathology  ACUTE REHABILITATION -- DAILY PROGRESS NOTE                 SWALLOWING:  Current level: MODIFIED INDEPENDENT    Diet: Regular consistency solids and thin consistency liquids      LANGUAGE:        RECEPTIVE:  Current FIM level: SUPERVISION      Short term FIM goal:        Long term FIM goal: MODIFIED INDEPENDENT         EXPRESSIVE:   Current FIM level: SUPERVISION      Short term FIM goal:       Long term FIM goal:  MODIFIED INDEPENDENT    Pt expressed wants and needs appropriately. COGNITION:  Pt seen at bedside. Reported that she did not feel well, felt she was \"going to A-Fib\"; nurse made aware. Eyes closed for majority of session. PROBLEM SOLVING: Current FIM level: MIN ASSISTANCE/MOD ASSISTANCE      Short term FIM goal: MIN ASSISTANCE      Long term FIM goal: SUPERVISION         MEMORY:    Current FIM level: MIN ASSISTANCE/MOD ASSISTANCE      Short term FIM goal:  MIN ASSISTANCE      Long term FIM goal:  SUPERVISION       SAFETY/INSIGHT:       Pt oriented to person, place, month, and year with 100% accuracy and no cueing. Reports that today is her daughters wedding. Pt completed problem solving task to which she was asked to ID and correct inaccuracies; difficulty noted with mental flexibility. Given time, pt able to ID  The inaccuracy but did require mod cues to correct the sentence/ statement. Compare/ contrast money management task completed with 90% accuracy. SPEECH INTELLIGIBILITY:  Good intelligibility for conversational speech.        Jonnathan Rivas M.S., [address]  [id number]  6/15/2019        Three Hour Rule Tracking:    Individual therapy:            30 minutes   Concurrent therapy:  0 minutes  Group therapy:   0 minutes  Co-treatment therapy:  0  minutes    Total minutes: 30 minutes

## 2019-06-15 NOTE — PROGRESS NOTES
[facility]  Comprehensive Progress Note    GENERAL:   Covering for Dr Sydney Vyas. 68 y old F,  With A.FIB, not on anticoagulation, Chronic Diastolic Heart Failure, Myelodysplastic Syndrome, on recurrent blood transfusions, admitted to the ARU on 6/12. She had a fall at home, was taken to a local hospital, found to have a SDH, then transferred to [facility], conservative treatment was recommended. Labs noted, has myelodysplastic syndrome.     VITALS:   Vitals:    06/14/19 0758 06/15/19 0000 06/15/19 0200 06/15/19 1015   BP: 128/64   (!) 124/58   Pulse: 102 96  112   Resp: 18   17   Temp: 97.2 °F (36.2 °C)   98.2 °F (36.8 °C)   TempSrc: Oral   Oral   SpO2: 92% 93% 98% 97%   Weight:       Height:           MEDICATIONS:  Scheduled Meds:   diltiazem  180 mg Oral Daily    acetaminophen  500 mg Oral 4 times per day    allopurinol  200 mg Oral BID    vitamin C  500 mg Oral Daily    bumetanide  2 mg Oral BID    vitamin D  2,000 Units Oral Daily    collagenase   Topical Daily    digoxin  125 mcg Oral Daily    ferrous sulfate  325 mg Oral Daily with breakfast    magnesium oxide  400 mg Oral Daily    oxyCODONE  5 mg Oral BID    pantoprazole  40 mg Oral QAM AC    sodium bicarbonate  650 mg Oral BID    ruxolitinib phosphate  10 mg Oral BID     Continuous Infusions:  PRN Meds:.acetaminophen, magnesium hydroxide, phenol, sodium chloride     LABS:  CBC:   Lab Results   Component Value Date    WBC 20.0 06/15/2019    RBC 2.64 06/15/2019    HGB 7.6 06/15/2019    HCT 23.7 06/15/2019    MCV 89.8 06/15/2019    MCH 28.8 06/15/2019    MCHC 32.1 06/15/2019    RDW 16.4 06/15/2019     06/15/2019    MPV 11.7 06/15/2019     BMP:    Lab Results   Component Value Date     06/14/2019    K 3.7 06/14/2019    CL 94 06/14/2019    CO2 27 06/14/2019    BUN 50 06/14/2019    CREATININE 1.0 06/14/2019    CALCIUM 8.3 06/14/2019    GFRAA >60 06/14/2019    LABGLOM 54 06/14/2019 GLUCOSE 103 06/14/2019       OBJECTIVE:    General:  Alert, oriented, in NAD. Abrasion on right forehead healing. Large subcutaneous hematoma on the right forehead. 3 + edema both L.ex. HENT:   Right subconjunctival hemorrhage. Neck:   Supple. Heart:   Irregularly irregular. Chest:   Clear to auscultation. Abdomen:  Soft, non tender with 2+ BS. Neurological:  No focal deficits. Musculo-skeletal: Decrease ROM of the shoulders, right > left, and both knees. FUNCTIONAL STATUS:     Cognition:   MIN to MOD Deficits. Speech:  WFL. ADLs and Self Care: MOD to MIN A. Bed Mobility:  MIN to MOD A. Transfers:   MIN to MOD A. Gait:     13' - 36'  With [employer] with CGA. Stairs:    1 step with 2 rails with MIN A.    ROM:     DIAGNOSES:  1.) SDH.  2.) ADLs and Gait Dysfunction. 3.) Abrasions and subcutaneous hematoma. 4.) Myelodysplastic syndrome. PLAN:    1.) Continue Rehab Program.  2.) Speech recommend to advance Diet to Regular consistency with thin liquids.

## 2019-06-15 NOTE — CONSULTS
Hilda and Breanne Arevalo      Patient Name: Margarito Herrera  YOB: 1945  PCP: No primary care provider on file. Referring Provider: Antonella Bailey [id number]     Reason for Consultation: No chief complaint on file. History of Present Illness: This pt is a very pleasant 69 yo female who initially had a fall at home, taken to a local hospital and transferred to [facility] where she was diagnosed with a subdural hematoma. She did not undergo surgery and was transferred here for rehab. She has a history of MF and appears to be a good historian, stating she follows with Dr. Rohan Billings at [facility] and is on Jakafi 10 mg PO BID which she has been on for at least a few years. She was tried on ZULEIMA support initially but did not receive any benefit. She states her WBC and platelet count are generally well controlled. Hematology has been asked to provide consultation in regards to her MF    Diagnostic Data:     History reviewed. No pertinent past medical history. Patient Active Problem List    Diagnosis Date Noted    Traumatic brain injury of unknown intent ([facility] [address] 06/13/2019        History reviewed. No pertinent surgical history. Family History  History reviewed. No pertinent family history. Social History  No illicit drug use    TOBACCO:   reports that she has never smoked. She has never used smokeless tobacco.  ETOH:   reports that she drinks alcohol. Home Medications  Prior to Admission medications    Medication Sig Start Date End Date Taking?  Authorizing Provider   alendronate (FOSAMAX) 70 MG tablet Take 70 mg by mouth daily   Yes Historical Provider, MD   allopurinol (ZYLOPRIM) 100 MG tablet Take 100 mg by mouth 2 times daily Indications: take 2 tablets   Yes Historical Provider, MD   ascorbic acid (VITAMIN C) 500 MG tablet Take 500 mg by mouth daily 6/13/19 6/21/19 Yes Historical Provider, MD   bumetanide (BUMEX) 1 MG tablet Take 2 mg by mouth 2 times daily   Yes Historical Provider, MD   Cholecalciferol (VITAMIN D3) 2000 units CAPS Take 2,000 Units by mouth daily   Yes Historical Provider, MD   digoxin (LANOXIN) 125 MCG tablet Take 125 mcg by mouth daily   Yes Historical Provider, MD   diltiazem (CARDIZEM CD) 120 MG extended release capsule Take 120 mg by mouth daily   Yes Historical Provider, MD   ferrous sulfate 325 (65 Fe) MG tablet Take 325 mg by mouth daily (with breakfast)   Yes Historical Provider, MD   ruxolitinib phosphate (JAKAFI) 10 MG tablet Take by mouth 2 times daily   Yes Historical Provider, MD   magnesium oxide (MAG-OX) 400 MG tablet Take 400 mg by mouth daily   Yes Historical Provider, MD   metolazone (ZAROXOLYN) 5 MG tablet Take 5 mg by mouth 2 times daily   Yes Historical Provider, MD   oxyCODONE 5 MG capsule Take 5 mg by mouth 2 times daily. Yes Historical Provider, MD   pantoprazole (PROTONIX) 40 MG tablet Take 40 mg by mouth daily   Yes Historical Provider, MD   phenol 1.4 % LIQD mouth spray Take 2 sprays by mouth every hour as needed for Sore Throat   Yes Historical Provider, MD   sodium bicarbonate 650 MG tablet Take 650 mg by mouth 2 times daily   Yes Historical Provider, MD   sodium chloride (OCEAN, BABY AYR) 0.65 % nasal spray 1 spray by Nasal route 4 times daily as needed for Congestion   Yes Historical Provider, MD       Allergies  Allergies   Allergen Reactions    Metoprolol Tartrate [Metoprolol]        Review of Systems:    Constitutional:  No fever chills or rigors. + fatigue  Eyes: No changes in vision, discharge, or pain  ENT: No Headaches, hearing loss or vertigo. No mouth sores or sore throat. No change in taste or smell. Cardiovascular: No chest discomfort, dyspnea on exertion, palpitations or loss of consciousness. or phlebitis. Respiratory: Has no cough or wheezing, Has no sputum production. Has no hemoptysis, Has no pleuritic pain, .    Gastrointestinal: No abdominal pain, appetite loss, 0.00 (L) 06/14/2019    BASOSABS 0.00 06/14/2019       Lab Results   Component Value Date     06/14/2019    K 3.7 06/14/2019    CL 94 (L) 06/14/2019    CO2 27 06/14/2019    BUN 50 (H) 06/14/2019    CREATININE 1.0 06/14/2019    GLUCOSE 103 (H) 06/14/2019    CALCIUM 8.3 (L) 06/14/2019    LABGLOM 54 06/14/2019    GFRAA >60 06/14/2019       No results found for: IRON, TIBC, FERRITIN        Radiology-    No orders to display         ASSESSMENT/PLAN :  67 yo female  Admitted for further rehab s/p fall and subdural hematoma not requiring surgical intervention  Myelofibrosis    - CBC reviewed, consistent with MF  - Per pt, her platelet and WBC counts are normally well controlled. Current elevations likely reactive in nature from trauma and may continue to down trend  - Would monitor her CBC daily  - Continue Jakafi 10 mg PO BID  - Would not add hydrea at this time as her counts may continue to improve on their own  - Transfuse for Hgb <7  - Have requested records from Dr. Jacki Pastrana  - Will follow    Thank you for this consult.  Please call with further questions or concerns      Electronically signed by Mickie Yanes MD on 6/15/2019 at 10:52 AM

## 2019-06-15 NOTE — PROGRESS NOTES
Occupational Therapy  OCCUPATIONAL THERAPY DAILY NOTE    Date:6/15/2019  Patient Name: Araceli Robertson  MRN: [id number]  : 1945  Room: [address] [place] [address]     Diagnosis: S/P Fall with SDH, Lacerations to Head and R Elbow     Precautions: falls, O2 2 liters     Functional Assessment:   Date Status AE  Comments   Feeding 19  Minimal Assist      Grooming 19  Minimal Assist      Bathing 19  Moderate Assist      UB Dressing 19  Minimal Assist      LB Dressing 19  Moderate Assist Reacher  Pt donned hospital pants using reacher   Homemaking 19  TBA        Functional Transfers / Balance:   Date Status DME  Comments   Sit Balance 1519  Supervision   Sitting balance on commode, up in w.c and on EOB. Stand Balance 6/15/19  Minimal Assist  rollator w. walker Standing balance completed during toilet hygiene/clothing mgmt along with ambulation and transfers. [] Tub  [x] Shower   Transfer 6/15/19  Min assist Shower chair  Grab bars Pt used rollator w.w ivaner to Trunity with assist for walker mgmt. Pt elevated on/off shower chair needing vc's for hand/trunk placement along with 02 line mgmt. Commode   Transfer    toileting  6/15/19      6/15/19   Minimal Assist       Moderate Assist  Grab rail  Min assist with w/c>commode transfers using grab bar. Pt required assist for toileting following bowel movement along with clothing mgmt due to overall weakness/fatigued. Functional   Mobility 6/15/19  Minimal Assist  ww  rollator Short distance in room using rollator w.w alker to ambulate from bathroom back to EOB. Other:  Sit<>stand         EOB to supine    6/15/19        6/15/19   MIN A         Min assist   HHA  Grab bar/rollator walker   Sit to stand transfers form w/c>commode, shower chair to rollator w. Cherelle Mullet assist for BLE mgmt for EOB into bed transfers. Pt fatigued today.         Functional Exercises / Activity:    Sensory / Neuromuscular Re-Education:      Cognitive Skills:   Status Comments   Problem   Solving fair  vc's for hand/trunk placement on/off commode and shower chair. Memory fair  \"   Sequencing fair  \"   Safety fair  vc's for safety to enter/exit shower stall using rollator w. walker     Visual Perception:    Education:  Pt educated with safety ambulating in/out of shower stall using rollator walker then on. off shower chair. [] Family teach completed on:    Pain Level: 0/10 just stating very tired out today however was on 02 2L during entire OT tx. .   Additional Notes:      Patient has made fair  progress during treatment sessions toward set goals. Therapy emphasis to obtain goals:Current Treatment Recommendations: Strengthening, Gait Training, Safety Education & Training, Balance Training, Patient/Caregiver Education & Training, Self-Care / ADL, Functional Mobility Training, Neuromuscular Re-education, Equipment Evaluation, Education, & procurement, Home Management Training, Endurance Training, Cognitive Reorientation          [x] Continue with current OT Plan of care.   [] Prepare for Discharge     DISCHARGE RECOMMENDATIONS  Recommended DME:    Post Discharge Care:   []Home Independently  []Home with 24hr Care / Supervision [x]Home with Partial Supervision []Home with Home Health OT []Home with Out Pt OT []Other: ___   Comments:         Time in Time out Tx Time Breakdown  Variance:   First Session  12:10pm 12:40pm [x] Individual Tx- 30mins  [] Concurrent Tx -  [] Co-Tx -   [] Group Tx -   [] Time Missed -     Second Session   [] Individual Tx-   [] Concurrent Tx - min  [] Co-Tx -   [] Group Tx -   [] Time Missed -     Third Session    [] Individual Tx-   [] Concurrent Tx -  [] Co-Tx -   [] Group Tx -   [] Time Missed -         Total Tx Time :100 Kevan GRANT/L [id number]

## 2019-06-15 NOTE — FLOWSHEET NOTE
Pt refused many of her medications in spite of education regarding the importance of them. States' I don't need them.'

## 2019-06-16 PROBLEM — E44.0 MODERATE PROTEIN-CALORIE MALNUTRITION (HCC): Chronic | Status: ACTIVE | Noted: 2019-06-16

## 2019-06-16 PROCEDURE — 6370000000 HC RX 637 (ALT 250 FOR IP): Performed by: PHYSICAL MEDICINE & REHABILITATION

## 2019-06-16 PROCEDURE — 1280000000 HC REHAB R&B

## 2019-06-16 RX ADMIN — OXYCODONE HYDROCHLORIDE 5 MG: 5 TABLET ORAL at 09:40

## 2019-06-16 RX ADMIN — COLLAGENASE SANTYL: 250 OINTMENT TOPICAL at 09:43

## 2019-06-16 RX ADMIN — BUMETANIDE 2 MG: 1 TABLET ORAL at 09:42

## 2019-06-16 RX ADMIN — FERROUS SULFATE TAB 325 MG (65 MG ELEMENTAL FE) 325 MG: 325 (65 FE) TAB at 09:42

## 2019-06-16 RX ADMIN — DIGOXIN 125 MCG: 125 TABLET ORAL at 09:41

## 2019-06-16 RX ADMIN — OXYCODONE HYDROCHLORIDE 5 MG: 5 TABLET ORAL at 20:37

## 2019-06-16 RX ADMIN — ACETAMINOPHEN 500 MG: 500 TABLET ORAL at 01:00

## 2019-06-16 RX ADMIN — DILTIAZEM HYDROCHLORIDE 180 MG: 180 CAPSULE, EXTENDED RELEASE ORAL at 09:41

## 2019-06-16 ASSESSMENT — PAIN DESCRIPTION - PAIN TYPE
TYPE: CHRONIC PAIN
TYPE: CHRONIC PAIN

## 2019-06-16 ASSESSMENT — PAIN SCALES - GENERAL
PAINLEVEL_OUTOF10: 8
PAINLEVEL_OUTOF10: 8
PAINLEVEL_OUTOF10: 6
PAINLEVEL_OUTOF10: 3
PAINLEVEL_OUTOF10: 0

## 2019-06-16 ASSESSMENT — PAIN DESCRIPTION - LOCATION
LOCATION: BACK
LOCATION: BACK

## 2019-06-16 ASSESSMENT — PAIN - FUNCTIONAL ASSESSMENT: PAIN_FUNCTIONAL_ASSESSMENT: PREVENTS OR INTERFERES WITH MANY ACTIVE NOT PASSIVE ACTIVITIES

## 2019-06-16 ASSESSMENT — PAIN DESCRIPTION - PROGRESSION: CLINICAL_PROGRESSION: NOT CHANGED

## 2019-06-16 ASSESSMENT — PAIN DESCRIPTION - ONSET: ONSET: ON-GOING

## 2019-06-16 ASSESSMENT — PAIN DESCRIPTION - ORIENTATION
ORIENTATION: LOWER
ORIENTATION: LOWER

## 2019-06-16 ASSESSMENT — PAIN DESCRIPTION - FREQUENCY: FREQUENCY: CONTINUOUS

## 2019-06-16 ASSESSMENT — PAIN DESCRIPTION - DESCRIPTORS: DESCRIPTORS: ACHING;DISCOMFORT;SORE

## 2019-06-16 NOTE — PROGRESS NOTES
Physical Therapy    Facility/Department: [facility] 5SE REHAB      NAME: Elyssa Salter  : 1945  MRN: [id number]    Date of Service: 2019    Evaluating Therapist: Victor M Blanco DPT    ROOM: [address] [place] [address]  DIAGNOSIS: TBI  PMH: Pt found down in residence on 19 with facial wound. CT of head demonstrated L SDH with shift (from assumed fall). L frontal facial laceration s/p repair noted during hospitalization. Intubated upon arrival to hospital. PMH includes myelofibrosis, CHF, AFIB, GERG, pacemaker, CHF. PRECAUTIONS: fluid restriction, falls, dysphagia     Social:  Pt lives alone in a single floor Sr. high rise/independent living facility (3rd floor apartment with elevator access). PTA, pt used a rollator for mobility, w/c, ww, lift chair. [facility] RN 2x a week from [facility] located in [place], [place]     Initial Evaluation  19 A. M. Short Term Goals Long Term Goals    Was pt agreeable to Eval/treatment? yes No see comments      Does pt have pain?  C/o mild HA/ R arm pain       Bed Mobility  Rolling: sba  Supine to sit: cg/sba  Sit to supine: Felipe  Scooting: sba   sup Mod I   Transfers Sit to stand: cgA  Stand to sit: cgA  Stand pivot: cgA with ww   sba with AAD Mod I with AAD   Ambulation    30 feet with ww with cg/sba   150 feet with AAD with sup >300 feet with AAD with mod I   Walking 10 feet on uneven surface NT       Wheel Chair Mobility NT       Car Transfers NT   sup Mod I   Stair negotiation: ascended and descended  1 steps with bilateral rail with Felipe   4 steps with bilateral rail with sba 4 steps with bilateral rail with sup   Curb Step:   ascended and descended 4 inch step with ww and eFlipe   4 inch step with AAD and sba 4 inch step with AAD and sup   Picking up object off the floor NT       BLE ROM WFl       BLE Strength Bilateral Hips 3+/5, knees/ankles 4/5       Balance  Sitting: sup  Standing: cgA with ww       Date Family Teach Completed TBA (dtr present on eval)       Is additional Family Teaching

## 2019-06-16 NOTE — PROGRESS NOTES
Occupational Therapy  Therapeutic Recreation Assessment     LEISURE INTEREST SURVEY               Key: P = Past Interest C = Current Interest F = Future Interest     Arts/Crafts:  ___Mechanical  ___Woodworking    ___ Painting   ___Floral arranging ___ Ceramics         _ __ Knitting                                                     _P__ Crocheting        ___Other: ___________      Cooking:  _ _Baking _P__Cooking    ___   Other: _______   Comments:       Games:  ___Cards  ___Darts  ___Board games    ___Word games  ___Crossword puzzles    C___Seek-n-find                   ___Other: _________      Horticulture:  _P__Vegetables  _P__Flowers  ___House plants                                                     ___Other: ___________      House/Yard Care:  ___Ironing  ___Laundry  ___Cleaning                                                      ___Repairs              ___Lawn care                                                     ___Other: ___________      Music Listening/Playing:  ___Classical       ___Big Band       ___Rock-n-Roll                                                     ___Country          ___R&B             ___Gospel/Hymns                                                     ___Other: ___________      Outdoor Activities:  ___Hunting          ___Fishing          P___Camping                                                     ___Other: ___________      Pets/Animals:  P___Dogs             _P__Cats                ___Fish                                                     ___Birds             ___Livestock         ___Other: _________    Reading:   ___Newspapers  ___Magazines ___Other:stories                                                     ___Other: ___________      Nondenominational Activities:  Latter-day: ___________   Berle Joyner Activities: ___________      Shopping:   ___Grocery  ___Clothing  ___Flea market     _C__Other: __Looking around_________      Socialization: _C__Family  C___Friends  _C__Neighbors       ___Church ___Volunteer ___Club/Organization                                                     ___Other: ___________    Sports/Exercises:  ___Walking  ___Football  ___Basketball     ___Golf  ___Baseball  ___Tennis       ___Bowling  ___Other: ___________      Traveling:   ___Global  _P__Stateside  ___Local       ___Other: ___________      TV/Radio:   _C__Mysteries  ___Comedies ___Romance                                                     ___Game show       ___Sports       ___News                                                      ___Talk show          ___Other: ___________      OtherKhoainésedilia Avelsherman identified feelings of being frustrated. Personal Leisure Profile:   Question Response   Attributes Identify a positive quality: []Ambitious  []Appreciative  []Caring  []Courteous  []Considerate  []Compassionate  []Creative  []Dependable   []Generous  []Honest  []Hard working  [x]Helpful  []Kind  []Latimer   []Gratis  []Mannerly  []Patient  []Polite  []Respectful  []Sociable  []Sense of humor  []Thoughtful  []Other: ___________    You are very good at [employer]   Awareness Why do you participate in your leisure interest: []Competition  []Creativity  []Concentration  []Exercise  [x]Enjoyment  []Fun  []Hand/eye Coordination  []Increase confidence  []Learning a new skill  []Relaxation  []Social Interaction  []Supporting one another  []Thinking  []Other: ___________    Are your leisure activities limited at this time? [x]Yes  []No  Comments: _________    How often do you participate in your leisure interest? 2 x a week   Resources Name a restaurant, store or business you frequent? Pauline Bourne When are you most happy?  My kids     Barriers to Leisure Participation:  []Visual   [][place]  []Cognition/Communication  []Motivation  []Financial  []Transportation  []Time Constraints  [x]Physical Ability  []Leisure Awareness  []Drug/Alcohol  []Other: ___________    Recommendations:  [x]Group Session  [x]Individual Session  []Client Refused Services  []No Therapy  []Other: ___________    Objectives:  []Establish adaptations for leisure involvement   [x]Increase Self worth/self esteem  []Community reintegration training   [x]Social interaction  [x]Leisure participation  []Other: ___________    Goals for Therapeutic Recreation Services:   Social Interaction:   Admission Functional Gregory Measure: _____4______  Discharge Functional Gregory Measure: ____5 -_6______   Other:________________________________      Leisure Choice/ Increase Nalini Quality of Life: To participate in 1 premorbid leisure activities of choice by discharge to increase leisure choice and independence thus increasing the overall quality of his/her life. Leisure Activity Tolerance: To increase leisure tolerance by attending 1 leisure activities per week for 20 minutes with active participation. Self Expression: To participate in 1 leisure activity(s) of choice, identifying 1 benefits to leisure participation. Leisure Skills: To increase leisure skills by displaying the ability to participate in 1 new leisure activities by discharge. Self esteem/confidence: To complete 1 leisure activities with success 1x  per week by discharge. Zay Monge

## 2019-06-16 NOTE — PLAN OF CARE
Problem: Malnutrition  (NI-5.2)  Goal: Food and/or Nutrient Delivery  Description add Magic cup BID  Individualized approach for food/nutrient provision.   Outcome: Met This Shift

## 2019-06-17 LAB
ANION GAP SERPL CALCULATED.3IONS-SCNC: 13 MMOL/L (ref 7–16)
BUN BLDV-MCNC: 51 MG/DL (ref 8–23)
CALCIUM SERPL-MCNC: 8 MG/DL (ref 8.6–10.2)
CHLORIDE BLD-SCNC: 91 MMOL/L (ref 98–107)
CO2: 24 MMOL/L (ref 22–29)
CREAT SERPL-MCNC: 1.3 MG/DL (ref 0.5–1)
GFR AFRICAN AMERICAN: 49
GFR NON-AFRICAN AMERICAN: 40 ML/MIN/1.73
GLUCOSE BLD-MCNC: 97 MG/DL (ref 74–99)
HCT VFR BLD CALC: 21.7 % (ref 34–48)
HEMOGLOBIN: 7 G/DL (ref 11.5–15.5)
MCH RBC QN AUTO: 29 PG (ref 26–35)
MCHC RBC AUTO-ENTMCNC: 32.3 % (ref 32–34.5)
MCV RBC AUTO: 90 FL (ref 80–99.9)
PDW BLD-RTO: 16.7 FL (ref 11.5–15)
PLATELET # BLD: 753 E9/L (ref 130–450)
PMV BLD AUTO: 11.8 FL (ref 7–12)
POTASSIUM SERPL-SCNC: 4.8 MMOL/L (ref 3.5–5)
RBC # BLD: 2.41 E12/L (ref 3.5–5.5)
SODIUM BLD-SCNC: 128 MMOL/L (ref 132–146)
WBC # BLD: 20.5 E9/L (ref 4.5–11.5)

## 2019-06-17 PROCEDURE — 36415 COLL VENOUS BLD VENIPUNCTURE: CPT

## 2019-06-17 PROCEDURE — 97110 THERAPEUTIC EXERCISES: CPT

## 2019-06-17 PROCEDURE — 97535 SELF CARE MNGMENT TRAINING: CPT

## 2019-06-17 PROCEDURE — 85027 COMPLETE CBC AUTOMATED: CPT

## 2019-06-17 PROCEDURE — 1280000000 HC REHAB R&B

## 2019-06-17 PROCEDURE — 80048 BASIC METABOLIC PNL TOTAL CA: CPT

## 2019-06-17 PROCEDURE — 97530 THERAPEUTIC ACTIVITIES: CPT

## 2019-06-17 PROCEDURE — 92508 TX SP LANG VOICE COMM GROUP: CPT

## 2019-06-17 PROCEDURE — 2700000000 HC OXYGEN THERAPY PER DAY

## 2019-06-17 PROCEDURE — 97127 HC SP THER IVNTJ W/FOCUS COG FUNCJ: CPT

## 2019-06-17 PROCEDURE — 6370000000 HC RX 637 (ALT 250 FOR IP): Performed by: PHYSICAL MEDICINE & REHABILITATION

## 2019-06-17 RX ADMIN — BUMETANIDE 2 MG: 1 TABLET ORAL at 09:34

## 2019-06-17 RX ADMIN — OXYCODONE HYDROCHLORIDE 5 MG: 5 TABLET ORAL at 22:08

## 2019-06-17 RX ADMIN — FERROUS SULFATE TAB 325 MG (65 MG ELEMENTAL FE) 325 MG: 325 (65 FE) TAB at 09:34

## 2019-06-17 RX ADMIN — OXYCODONE HYDROCHLORIDE 5 MG: 5 TABLET ORAL at 09:37

## 2019-06-17 RX ADMIN — DIGOXIN 125 MCG: 125 TABLET ORAL at 09:37

## 2019-06-17 RX ADMIN — DILTIAZEM HYDROCHLORIDE 180 MG: 180 CAPSULE, EXTENDED RELEASE ORAL at 09:34

## 2019-06-17 ASSESSMENT — PAIN DESCRIPTION - LOCATION
LOCATION: BACK
LOCATION: BACK

## 2019-06-17 ASSESSMENT — PAIN DESCRIPTION - PAIN TYPE
TYPE: CHRONIC PAIN
TYPE: CHRONIC PAIN

## 2019-06-17 ASSESSMENT — PAIN SCALES - GENERAL
PAINLEVEL_OUTOF10: 0
PAINLEVEL_OUTOF10: 0
PAINLEVEL_OUTOF10: 7
PAINLEVEL_OUTOF10: 3
PAINLEVEL_OUTOF10: 7
PAINLEVEL_OUTOF10: 0

## 2019-06-17 ASSESSMENT — PAIN DESCRIPTION - ONSET
ONSET: ON-GOING
ONSET: ON-GOING

## 2019-06-17 ASSESSMENT — PAIN DESCRIPTION - DESCRIPTORS
DESCRIPTORS: ACHING;DISCOMFORT
DESCRIPTORS: ACHING;DISCOMFORT;DULL

## 2019-06-17 ASSESSMENT — PAIN DESCRIPTION - PROGRESSION
CLINICAL_PROGRESSION: NOT CHANGED
CLINICAL_PROGRESSION: NOT CHANGED

## 2019-06-17 ASSESSMENT — PAIN - FUNCTIONAL ASSESSMENT: PAIN_FUNCTIONAL_ASSESSMENT: PREVENTS OR INTERFERES SOME ACTIVE ACTIVITIES AND ADLS

## 2019-06-17 ASSESSMENT — PAIN DESCRIPTION - FREQUENCY
FREQUENCY: CONTINUOUS
FREQUENCY: CONTINUOUS

## 2019-06-17 ASSESSMENT — PAIN DESCRIPTION - ORIENTATION
ORIENTATION: LOWER
ORIENTATION: LOWER

## 2019-06-17 NOTE — PROGRESS NOTES
Physical Therapy    Facility/Department: [address] REHAB  Weekly Note     NAME: Enid Martell  : 1945  MRN: [id number]    Date of Service: 2019    Evaluating Therapist: Dixon Patel DPT    ROOM: [address] [place] [address]  DIAGNOSIS: TBI  PMH: Pt found down in residence on 19 with facial wound. CT of head demonstrated L SDH with shift (from assumed fall). L frontal facial laceration s/p repair noted during hospitalization. Intubated upon arrival to hospital. PMH includes myelofibrosis, CHF, AFIB, GERG, pacemaker, CHF. PRECAUTIONS: fluid restriction, falls, dysphagia     Social:  Pt lives alone in a single floor Sr. high rise/independent living facility (3rd floor apartment with elevator access). PTA, pt used a rollator for mobility, w/c, ww, lift chair. [facility] RN 2x a week from [facility] located in [place], [place]     Initial Evaluation  19 AM     comments  Short Term Goals Long Term Goals    Was pt agreeable to Eval/treatment? yes Yes       Does pt have pain?  C/o mild HA/ R arm pain No c/o pain      Bed Mobility  Rolling: sba  Supine to sit: cg/sba  Sit to supine: Felipe  Scooting: sba Rolling: SBA  Supine to sit: min A   Sit to supine: mod A   Scooting: SBA Assist with LEs  sup Mod I   Transfers Sit to stand: cgA  Stand to sit: cgA  Stand pivot: cgA with ww Sit to stand min A  Stand to sit SBA  Stand pivot with ww with SBA Min cues for safety  sba with AAD   sup with AAD          Ambulation    30 feet with ww with cg/sba 125 feet with ww with SBA Significant time required for rest post 150 feet with AAD with sup  150 feet with AAD with  sup   Walking 10 feet on uneven surface NT nt      Wheel Chair Mobility NT NT      Car Transfers NT Min A  sup  sup    Stair negotiation: ascended and descended  1 steps with bilateral rail with Felipe 3 steps B HR min A  Descends retrograde  4 steps with bilateral rail with sba 4 steps with bilateral rail with  Felipe   Curb Step:   ascended and descended 4 inch step with ww and Felipe NT  4

## 2019-06-17 NOTE — PROGRESS NOTES
vc's for hand/trunk placement on/off commode and shower chair. Memory fair  \"   Sequencing fair  \"   Safety fair  vc's for safety to enter/exit shower stall using rollator w. walker     Visual Perception:    Education:  Pt educated with safety ambulating in/out of shower stall using rollator walker then on. off shower chair. [] Family teach completed on:    6/17/19: daughter present but did not participate in ADL. Pain Level: 7/10 at elbow/head, RN aware  0/10 just stating very tired out today however was on 02 2L during entire OT tx. Additional Notes:   6/17/19: requires motivation to participate in ADLs and self challenge     Patient has made fair  progress during treatment sessions toward set goals. Therapy emphasis to obtain goals:Current Treatment Recommendations: Strengthening, Gait Training, Safety Education & Training, Balance Training, Patient/Caregiver Education & Training, Self-Care / ADL, Functional Mobility Training, Neuromuscular Re-education, Equipment Evaluation, Education, & procurement, Home Management Training, Endurance Training, Cognitive Reorientation       [x] Continue with current OT Plan of care.   [] Prepare for Discharge     DISCHARGE RECOMMENDATIONS  Recommended DME:    Post Discharge Care:   []Home Independently  []Home with 24hr Care / Supervision [x]Home with Partial Supervision []Home with Home Health OT []Home with Out Pt OT []Other: ___   Comments:         Time in Time out Tx Time Breakdown  Variance:   First Session  8:45 9:30 [x] Individual Tx- 45  [] Concurrent Tx -  [] Co-Tx -   [] Group Tx -   [] Time Missed -     Second Session 10:45 11:30 [] Individual Tx-   [x] Concurrent Tx - 45 min  [] Co-Tx -   [] Group Tx -   [] Time Missed -     Third Session    [] Individual Tx-   [] Concurrent Tx -  [] Co-Tx -   [] Group Tx -   [] Time Missed -         Total Tx Time :90 mins   Darshana GRANT/BALJEET Felder, [address] [facility], OTR/L [id number]

## 2019-06-17 NOTE — PROGRESS NOTES
06/17/19 1531   Attendance   Activity Games  ([place])   Participation Active participation   [place] Demonstrates ability to complete social goals   Social Skills Cooperates with others in group activity   Leisure Education Demonstrates knowledge of benefits of leisure involvement  (Identified thinking and mind working as benefits.)   Time Spent With Patient   Minutes 35

## 2019-06-17 NOTE — PROGRESS NOTES
Educated patient on the importance of each medication. Patient refused teaching and became angry. She stated the only pill she cares about is her pain pill and nothing else. Will continue to educate patient.

## 2019-06-17 NOTE — PROGRESS NOTES
Speech Language Pathology  ACUTE REHABILITATION -- DAILY PROGRESS NOTE                 SWALLOWING:  Current level: MODIFIED INDEPENDENT    Diet: Regular consistency solids and thin consistency liquids      LANGUAGE:        RECEPTIVE:  Current FIM level: SUPERVISION      Short term FIM goal:        Long term FIM goal: MODIFIED INDEPENDENT         EXPRESSIVE:   Current FIM level: SUPERVISION      Short term FIM goal:       Long term FIM goal:  MODIFIED INDEPENDENT    Pt expressed wants and needs appropriately. Pt co-treated in a group with OT. Pt followed one step directions with good ability. COGNITION       PROBLEM SOLVING: Current FIM level: MIN ASSISTANCE      Short term FIM goal:       Long term FIM goal: SUPERVISION         MEMORY:    Current FIM level: MIN ASSISTANCE      Short term FIM goal:        Long term FIM goal:  SUPERVISION       SAFETY/INSIGHT:       Pt completed medication management task with fair- good ability. Per pt, she does not use any system when taking medication. Pt education on the importance of a medication management system. Pt in agreement with the education. SPEECH INTELLIGIBILITY:  Good intelligibility for conversational speech.        Kelby Gusman   Speech-Language Pathologist Student Intern  [id number]  6/17/2019        Three Hour Rule Tracking:    Individual therapy:            15 minutes   Concurrent therapy:  0 minutes  Group therapy:            30 minutes   Co-treatment therapy:  0  Minutes   Total minutes: 45 minutes

## 2019-06-17 NOTE — PROGRESS NOTES
Sav Allen is a 68 y.o. female patient.     Current Facility-Administered Medications   Medication Dose Route Frequency Provider Last Rate Last Dose    diltiazem (CARDIZEM CD) extended release capsule 180 mg  180 mg Oral Daily Bhavik Vázquez MD   180 mg at 06/16/19 0941    acetaminophen (TYLENOL) tablet 650 mg  650 mg Oral Q4H PRN Nevaeh Melgar MD        magnesium hydroxide (MILK OF MAGNESIA) 400 MG/5ML suspension 30 mL  30 mL Oral Daily PRN Nevaeh Melgar MD        acetaminophen (TYLENOL) tablet 500 mg  500 mg Oral 4 times per day Nevaeh Melgar MD   500 mg at 06/16/19 0100    allopurinol (ZYLOPRIM) tablet 200 mg  200 mg Oral BID Nevaeh Melgar MD   200 mg at 06/14/19 0919    vitamin C (ASCORBIC ACID) tablet 500 mg  500 mg Oral Daily Bhavik Vázquez MD        bumetanide (BUMEX) tablet 2 mg  2 mg Oral BID Nevaeh Melgar MD   2 mg at 06/16/19 0942    vitamin D tablet 2,000 Units  2,000 Units Oral Daily Nevaeh Melgar MD        collagenase ointment   Topical Daily Nevaeh Melgar MD        digoxin (LANOXIN) tablet 125 mcg  125 mcg Oral Daily Nevaeh Melgar MD   125 mcg at 06/16/19 0941    ferrous sulfate tablet 325 mg  325 mg Oral Daily with breakfast Nevaeh Melgar MD   325 mg at 06/16/19 0942    magnesium oxide (MAG-OX) tablet 400 mg  400 mg Oral Daily Bahvik Vázquez MD   400 mg at 06/14/19 0920    oxyCODONE (ROXICODONE) immediate release tablet 5 mg  5 mg Oral BID Nevaeh Melgar MD   5 mg at 06/16/19 2037    pantoprazole (PROTONIX) tablet 40 mg  40 mg Oral QAM AC Bhavik Vázquez MD        phenol 1.4 % mouth spray 1 spray  1 spray Mouth/Throat Q1H PRN Nevaeh Melgar MD        sodium bicarbonate tablet 650 mg  650 mg Oral BID Nevaeh Melgar MD   650 mg at 06/14/19 0920    sodium chloride (OCEAN, BABY AYR) 0.65 % nasal spray 1 spray  1 spray Each Nostril 4x Daily PRN Nevaeh Melgar MD        ruxolitinib phosphate (JAKAFI) 10 MG tablet TABS 10 mg  10 mg Oral BID sup Mod I   Stair negotiation: ascended and descended  1 steps with bilateral rail with Felipe     4 steps with bilateral rail with sba 4 steps with bilateral rail with sup   Curb Step:   ascended and descended 4 inch step with ww and Felipe     4 inch step with AAD and sba 4 inch step with AAD and sup   Picking up object off the floor NT           BLE ROM WFl           BLE Strength Bilateral Hips 3+/5, knees/ankles 4/5              Assessment:    Condition: In stable condition. Improving.   (TBI). Plan:   Encourage ambulation. (Tolerating therapy well  Fatigues rapidly  Will be doing daily blood counts  Patient routinely gets transfused about twice a month  Hematology following).        Rohan López MD  6/17/2019

## 2019-06-18 LAB
ABO/RH: NORMAL
ANTIBODY SCREEN: NORMAL
HCT VFR BLD CALC: 21.4 % (ref 34–48)
HEMOGLOBIN: 6.7 G/DL (ref 11.5–15.5)
MCH RBC QN AUTO: 28.2 PG (ref 26–35)
MCHC RBC AUTO-ENTMCNC: 31.3 % (ref 32–34.5)
MCV RBC AUTO: 89.9 FL (ref 80–99.9)
PDW BLD-RTO: 17 FL (ref 11.5–15)
PLATELET # BLD: 732 E9/L (ref 130–450)
PMV BLD AUTO: 11.8 FL (ref 7–12)
RBC # BLD: 2.38 E12/L (ref 3.5–5.5)
WBC # BLD: 19 E9/L (ref 4.5–11.5)

## 2019-06-18 PROCEDURE — 86850 RBC ANTIBODY SCREEN: CPT

## 2019-06-18 PROCEDURE — 36430 TRANSFUSION BLD/BLD COMPNT: CPT

## 2019-06-18 PROCEDURE — P9016 RBC LEUKOCYTES REDUCED: HCPCS

## 2019-06-18 PROCEDURE — 1280000000 HC REHAB R&B

## 2019-06-18 PROCEDURE — 97530 THERAPEUTIC ACTIVITIES: CPT

## 2019-06-18 PROCEDURE — 85027 COMPLETE CBC AUTOMATED: CPT

## 2019-06-18 PROCEDURE — 97127 HC SP THER IVNTJ W/FOCUS COG FUNCJ: CPT

## 2019-06-18 PROCEDURE — 6370000000 HC RX 637 (ALT 250 FOR IP): Performed by: INTERNAL MEDICINE

## 2019-06-18 PROCEDURE — 86923 COMPATIBILITY TEST ELECTRIC: CPT

## 2019-06-18 PROCEDURE — 86900 BLOOD TYPING SEROLOGIC ABO: CPT

## 2019-06-18 PROCEDURE — 36415 COLL VENOUS BLD VENIPUNCTURE: CPT

## 2019-06-18 PROCEDURE — 2700000000 HC OXYGEN THERAPY PER DAY

## 2019-06-18 PROCEDURE — 97110 THERAPEUTIC EXERCISES: CPT

## 2019-06-18 PROCEDURE — 86901 BLOOD TYPING SEROLOGIC RH(D): CPT

## 2019-06-18 PROCEDURE — 6370000000 HC RX 637 (ALT 250 FOR IP): Performed by: PHYSICAL MEDICINE & REHABILITATION

## 2019-06-18 PROCEDURE — 2580000003 HC RX 258: Performed by: PHYSICAL MEDICINE & REHABILITATION

## 2019-06-18 RX ORDER — ACETAMINOPHEN 325 MG/1
650 TABLET ORAL ONCE
Status: COMPLETED | OUTPATIENT
Start: 2019-06-18 | End: 2019-06-18

## 2019-06-18 RX ORDER — DIPHENHYDRAMINE HCL 25 MG
25 TABLET ORAL ONCE
Status: COMPLETED | OUTPATIENT
Start: 2019-06-18 | End: 2019-06-18

## 2019-06-18 RX ORDER — SODIUM CHLORIDE 0.9 % (FLUSH) 0.9 %
10 SYRINGE (ML) INJECTION PRN
Status: DISCONTINUED | OUTPATIENT
Start: 2019-06-18 | End: 2019-06-25 | Stop reason: HOSPADM

## 2019-06-18 RX ORDER — SODIUM CHLORIDE 0.9 % (FLUSH) 0.9 %
10 SYRINGE (ML) INJECTION 2 TIMES DAILY
Status: DISCONTINUED | OUTPATIENT
Start: 2019-06-18 | End: 2019-06-25 | Stop reason: HOSPADM

## 2019-06-18 RX ORDER — 0.9 % SODIUM CHLORIDE 0.9 %
250 INTRAVENOUS SOLUTION INTRAVENOUS ONCE
Status: COMPLETED | OUTPATIENT
Start: 2019-06-18 | End: 2019-06-19

## 2019-06-18 RX ADMIN — ACETAMINOPHEN 650 MG: 325 TABLET, FILM COATED ORAL at 19:04

## 2019-06-18 RX ADMIN — DILTIAZEM HYDROCHLORIDE 180 MG: 180 CAPSULE, EXTENDED RELEASE ORAL at 08:51

## 2019-06-18 RX ADMIN — FERROUS SULFATE TAB 325 MG (65 MG ELEMENTAL FE) 325 MG: 325 (65 FE) TAB at 08:50

## 2019-06-18 RX ADMIN — BUMETANIDE 2 MG: 1 TABLET ORAL at 17:38

## 2019-06-18 RX ADMIN — DIPHENHYDRAMINE HCL 25 MG: 25 TABLET ORAL at 19:04

## 2019-06-18 RX ADMIN — DIGOXIN 125 MCG: 125 TABLET ORAL at 08:51

## 2019-06-18 RX ADMIN — BUMETANIDE 2 MG: 1 TABLET ORAL at 08:50

## 2019-06-18 RX ADMIN — DIPHENHYDRAMINE HCL 25 MG: 25 TABLET ORAL at 13:39

## 2019-06-18 RX ADMIN — OXYCODONE HYDROCHLORIDE 5 MG: 5 TABLET ORAL at 08:50

## 2019-06-18 RX ADMIN — SODIUM CHLORIDE 250 ML: 9 INJECTION, SOLUTION INTRAVENOUS at 23:39

## 2019-06-18 RX ADMIN — ACETAMINOPHEN 650 MG: 325 TABLET, FILM COATED ORAL at 13:39

## 2019-06-18 ASSESSMENT — PAIN SCALES - GENERAL
PAINLEVEL_OUTOF10: 7
PAINLEVEL_OUTOF10: 0
PAINLEVEL_OUTOF10: 0
PAINLEVEL_OUTOF10: 4
PAINLEVEL_OUTOF10: 0
PAINLEVEL_OUTOF10: 0
PAINLEVEL_OUTOF10: 3
PAINLEVEL_OUTOF10: 0

## 2019-06-18 ASSESSMENT — PAIN DESCRIPTION - FREQUENCY: FREQUENCY: CONTINUOUS

## 2019-06-18 ASSESSMENT — PAIN DESCRIPTION - DESCRIPTORS: DESCRIPTORS: ACHING;CONSTANT;DISCOMFORT

## 2019-06-18 ASSESSMENT — PAIN DESCRIPTION - PROGRESSION: CLINICAL_PROGRESSION: NOT CHANGED

## 2019-06-18 ASSESSMENT — PAIN DESCRIPTION - LOCATION
LOCATION: BACK
LOCATION: BACK

## 2019-06-18 ASSESSMENT — PAIN DESCRIPTION - ONSET: ONSET: ON-GOING

## 2019-06-18 ASSESSMENT — PAIN DESCRIPTION - ORIENTATION: ORIENTATION: LOWER

## 2019-06-18 ASSESSMENT — PAIN DESCRIPTION - PAIN TYPE: TYPE: CHRONIC PAIN

## 2019-06-18 NOTE — PROGRESS NOTES
Occupational Therapy  OCCUPATIONAL THERAPY DAILY NOTE    Date:2019  Patient Name: Dom Holden  MRN: [id number]  : 1945  Room: [address] [place] [address]     Diagnosis: S/P Fall with SDH, Lacerations to Head and R Elbow     Precautions: falls, O2 2 liters     Functional Assessment:   Date Status AE  Comments   Feeding 19  Minimal Assist      Grooming 19  Min A     Bathing 19  Moderate Assist      UB Dressing 19  Set-up     LB Dressing 19  Moderate Assist Reacher     Homemaking 19  TBA        Functional Transfers / Balance:   Date Status DME  Comments   Sit Balance 1719  Supervision      Stand Balance 19  Min A rollator w. walker    [] Tub  [x] Shower   Transfer 19  Min assist Shower chair  Grab bars Verbal cues for safe technique    Commode   Transfer    toileting  19   Minimal Assist       Moderate Assist  Grab rail     Functional   Mobility 19   Minimal Assist  ww  rollator Short distance in pt's room with ww    Other:  Sit<>stand         W/c to bed     19    MIN A         Min assist   HHA  Grab bar/rollator walker         Stand pivot transfer with assist to lift BLE;'s onto bed      Functional Exercises / Activity:  BUE ROM and strength exercises with goyo box with 5 # wt on table top surface 3 sets 10 reps in all planes with reset breaks due to level of fatigue     Pt participated in leisure activity with focus on UB ROM , strength and fine motor coordination. Pt completed activity at supervision level        Sensory / Neuromuscular Re-Education:      Cognitive Skills:   Status Comments   Problem   Solving fair  vc's for hand/trunk placement on/off commode and shower chair. Memory fair  \"   Sequencing fair  \"   Safety fair  vc's for safety to enter/exit shower stall using rollator w. walker     Visual Perception:    Education:  Pt was educated on safe transfers in and out of walk in shower and types of DME.    Pt was

## 2019-06-18 NOTE — PROGRESS NOTES
Occupational Therapy     06/18/19 184   Attendance   Activity Puzzles   Participation Active participation   [place] Demonstrates ability to complete social goals   Leisure Education Demonstrates knowledge of benefits of leisure involvement  (Identified fun as a benefit.)   Leisure Attitude/Participation Participates in 1:1 structured activity   Time Spent With Patient   Minutes 30

## 2019-06-18 NOTE — PROGRESS NOTES
MD Kathie   10 mg at 06/17/19 2209     Allergies   Allergen Reactions    Metoprolol Tartrate [Metoprolol]      Active Problems:    Traumatic brain injury of unknown intent ([facility] [address] [facility]    Moderate protein-calorie malnutrition ([facility]  Resolved Problems:    * No resolved hospital problems. *    Blood pressure (!) 117/50, pulse 85, temperature 98.3 °F (36.8 °C), temperature source Oral, resp. rate 16, height 5' 3\" (1.6 m), weight 146 lb 12.8 oz (66.6 kg), SpO2 99 %. Subjective:  Symptoms:  Stable. She reports weakness. Diet:  Adequate intake. Activity level: Impaired due to weakness. Pain:  She complains of pain that is mild. Objective:  General Appearance: In no acute distress. Vital signs: (most recent): Blood pressure (!) 117/50, pulse 85, temperature 98.3 °F (36.8 °C), temperature source Oral, resp. rate 16, height 5' 3\" (1.6 m), weight 146 lb 12.8 oz (66.6 kg), SpO2 99 %. Vital signs are normal.    Output: Producing urine and producing stool. Lungs:  Normal effort and normal respiratory rate. Breath sounds clear to auscultation. Heart: Normal rate. Regular rhythm. S1 normal and S2 normal.    Abdomen: Abdomen is soft. Bowel sounds are normal.   There is no abdominal tenderness. Extremities: Normal range of motion. Neurological: Patient is alert. (4/5 str). Assessment:    Condition: In stable condition. Improving.   (TBI). Plan:   Encourage ambulation. (Hemoglobin today 6.7  Hematology note recommended transfusion below 7  Will proceed with orders  Review progress with therapy and team  Showing some increased edema at the ankles  She does not like compression stockings but will agree to wear them).        Hebert Winter MD  6/18/2019

## 2019-06-18 NOTE — PROGRESS NOTES
Physical Therapy    Facility/Department: [facility] 5SE REHAB  Treatment Note    NAME: Alisha Norman  : 1945  MRN: [id number]    Date of Service: 2019    Evaluating Therapist: [place] DPT    ROOM: [facility]/7341  DIAGNOSIS: TBI  PMH: Pt found down in residence on 19 with facial wound. CT of head demonstrated L SDH with shift (from assumed fall). L frontal facial laceration s/p repair noted during hospitalization. Intubated upon arrival to hospital. PMH includes myelofibrosis, CHF, AFIB, GERG, pacemaker, CHF. PRECAUTIONS: fluid restriction, falls, dysphagia     Social:  Pt lives alone in a single floor Sr. high rise/independent living facility (3rd floor apartment with elevator access). PTA, pt used a rollator for mobility, w/c, ww, lift chair. [facility] RN 2x a week from [facility] located in [place], [place]     Initial Evaluation  19 AM     PM  Short Term Goals Long Term Goals    Was pt agreeable to Eval/treatment? yes Yes  Yes      Does pt have pain? C/o mild HA/ R arm pain None  None      Bed Mobility  Rolling: sba  Supine to sit: cg/sba  Sit to supine: Felipe  Scooting: sba Sit to supine mod A with B LE  Supine to sit min A   Rolling: NT  Supine to sit: mod A   Sit to supine:  Mod A  Scooting: SBA sup Mod I   Transfers Sit to stand: cgA  Stand to sit: cgA  Stand pivot: cgA with ww Sit to stand min A  Stand to sit min A  Stand pivot with ww with min A Sit to stand min A  Stand to stand min A  Stand pivot with ww with min A sba with AAD Mod I with AAD   Ambulation    30 feet with ww with cg/sba NT 10 feet x 2 with ww with min A 150 feet with AAD with sup >300 feet with AAD with mod I   Walking 10 feet on uneven surface NT NT NT     Wheel Chair Mobility NT NT NT     Car Transfers NT Min A NT sup Mod I   Stair negotiation: ascended and descended  1 steps with bilateral rail with Felipe NT NT 4 steps with bilateral rail with sba 4 steps with bilateral rail with sup   Curb Step:   ascended and descended 4 inch step with ww and Felipe NT NT 4 inch step with AAD and sba 4 inch step with AAD and sup   Picking up object off the floor NT       BLE ROM WFl       BLE Strength Bilateral Hips 3+/5, knees/ankles 4/5       Balance  Sitting: sup  Standing: cgA with ww       Date Family Teach Completed TBA (dtr present on eval)       Is additional Family Teaching Needed? Y or N Y       Hindering Progress Balance/neuropathy, endurance/activity tolerance       PT recommended ELOS 7-10 days       Team's Discharge Plan        Therapist at Team Meeting          Pt performed therapeutic exercise of the following:   AM:  Ankle pumps (x20)   SAQs( B LE x10)   Heel slides ( B LE X10)       PM:  SAQ x10  AP x30  Hip abduction x10    Patient education  Pt was educated on energy conservation     Patient response to education:   Pt verbalized understanding Pt demonstrated skill Pt requires further education in this area   x  x     Additional Comments:Daughter present for AM session. Pt with increased complaints of fatigue. Per daughter pt with low hemoglobin ( needing transfusion). Poor activity tolerance this am.      Pm  Pt continues to have complaints of increased fatigue. Daughter present and reports blood transfusion this pm/evening. Pt falling alseep throughout treatment and required constant verbal cues to focus on task. Pt returned to bed at end of treatment. Time in: 1015  Time out: 1045    Time in: 1515  Time out: 1545    Pt is making limited  progress toward established Physical Therapy goals. Continue with physical therapy current plan of care.     Pooja Stiles Number: [id number]

## 2019-06-18 NOTE — PLAN OF CARE
Problem: Pain:  Goal: Pain level will decrease  Description  Pain level will decrease  Outcome: Met This Shift     Problem: Falls - Risk of:  Goal: Will remain free from falls  Description  Will remain free from falls  Outcome: Met This Shift     Problem: Skin Integrity:  Goal: Will show no infection signs and symptoms  Description  Will show no infection signs and symptoms  Outcome: Met This Shift

## 2019-06-18 NOTE — PROGRESS NOTES
Speech Language Pathology  ACUTE REHABILITATION -- DAILY PROGRESS NOTE                 SWALLOWING:  Current level: MODIFIED INDEPENDENT    Diet: Regular consistency solids and thin consistency liquids      LANGUAGE:        RECEPTIVE:  Current FIM level: SUPERVISION      Short term FIM goal:        Long term FIM goal: MODIFIED INDEPENDENT         EXPRESSIVE:   Current FIM level: SUPERVISION      Short term FIM goal:       Long term FIM goal:  MODIFIED INDEPENDENT    Pt expressed wants and needs appropriately. Provided context specific response to intermediate/complex level wh- questions. Actively participated in an ideational language-based categorization activity that asked participants to provide a semantically relevant response given specific pop culture generated stimulus items . COGNITION       PROBLEM SOLVING: Current FIM level: MIN ASSISTANCE/MOD ASSISTANCE      Short term FIM goal: MIN ASSISTANCE      Long term FIM goal: SUPERVISION         MEMORY:    Current FIM level: MIN ASSISTANCE/MOD ASSISTANCE      Short term FIM goal:  MIN ASSISTANCE      Long term FIM goal:  SUPERVISION       SAFETY/INSIGHT:      Demonstrated good STM recall of peers' response and was consistently able to provide novel and/or personalized response to probe items. Readily engaged others in interaction for both social and therapeutic purposes, and effectively maintained established rapport for duration of treatment session. SPEECH INTELLIGIBILITY:  Good intelligibility for conversational speech.        Janusz6 S Walker Pathologist Student Intern  6/18/2019      Three Hour Rule Tracking:    Individual therapy:            0 minutes   Concurrent therapy:  0 minutes  Group therapy:   45 minutes 915-10  Co-treatment therapy:  0 Minutes     Total minutes: 45 minutes

## 2019-06-18 NOTE — PROGRESS NOTES
tablet Take 2 mg by mouth 2 times daily   Yes Historical Provider, MD   Cholecalciferol (VITAMIN D3) 2000 units CAPS Take 2,000 Units by mouth daily   Yes Historical Provider, MD   digoxin (LANOXIN) 125 MCG tablet Take 125 mcg by mouth daily   Yes Historical Provider, MD   diltiazem (CARDIZEM CD) 120 MG extended release capsule Take 120 mg by mouth daily   Yes Historical Provider, MD   ferrous sulfate 325 (65 Fe) MG tablet Take 325 mg by mouth daily (with breakfast)   Yes Historical Provider, MD   ruxolitinib phosphate (JAKAFI) 10 MG tablet Take by mouth 2 times daily   Yes Historical Provider, MD   magnesium oxide (MAG-OX) 400 MG tablet Take 400 mg by mouth daily   Yes Historical Provider, MD   metolazone (ZAROXOLYN) 5 MG tablet Take 5 mg by mouth 2 times daily   Yes Historical Provider, MD   oxyCODONE 5 MG capsule Take 5 mg by mouth 2 times daily. Yes Historical Provider, MD   pantoprazole (PROTONIX) 40 MG tablet Take 40 mg by mouth daily   Yes Historical Provider, MD   phenol 1.4 % LIQD mouth spray Take 2 sprays by mouth every hour as needed for Sore Throat   Yes Historical Provider, MD   sodium bicarbonate 650 MG tablet Take 650 mg by mouth 2 times daily   Yes Historical Provider, MD   sodium chloride (OCEAN, BABY AYR) 0.65 % nasal spray 1 spray by Nasal route 4 times daily as needed for Congestion   Yes Historical Provider, MD       Allergies  Allergies   Allergen Reactions    Metoprolol Tartrate [Metoprolol]        Review of Systems:    Constitutional:  No fever chills or rigors. + fatigue  Eyes: No changes in vision, discharge, or pain  ENT: No Headaches, hearing loss or vertigo. No mouth sores or sore throat. No change in taste or smell. Cardiovascular: No chest discomfort, dyspnea on exertion, palpitations or loss of consciousness. or phlebitis. Respiratory: Has no cough or wheezing, Has no sputum production. Has no hemoptysis, Has no pleuritic pain, .    Gastrointestinal: No abdominal pain, appetite EOSABS 0.00 (L) 06/14/2019    BASOSABS 0.00 06/14/2019       Lab Results   Component Value Date     (L) 06/17/2019    K 4.8 06/17/2019    CL 91 (L) 06/17/2019    CO2 24 06/17/2019    BUN 51 (H) 06/17/2019    CREATININE 1.3 (H) 06/17/2019    GLUCOSE 97 06/17/2019    CALCIUM 8.0 (L) 06/17/2019    LABGLOM 40 06/17/2019    GFRAA 49 06/17/2019       No results found for: IRON, TIBC, FERRITIN        Radiology-    No orders to display         ASSESSMENT/PLAN :  67 yo female  Admitted for further rehab s/p fall and subdural hematoma not requiring surgical intervention  Myelofibrosis    - CBC reviewed, consistent with MF  - Per pt, her platelet and WBC counts are normally well controlled.  Current elevations likely reactive in nature from trauma and may continue to down trend  - Would monitor her CBC daily  - Continue Jakafi 10 mg PO BID  - Would not add hydrea at this time as her counts may continue to improve on their own  - Transfuse for Hgb <7  Her hemoglobin today was down to 6.7 and she will be transfused with 1 unit of packed RBC      Electronically signed by Fady Huynh MD on 6/18/2019 at 5:26 PM

## 2019-06-18 NOTE — PATIENT CARE CONFERENCE
[address] [place], [place] [address] [place]  INPATIENT ACUTE REHABILITATION  TEAM CONFERENCE NOTE/PATIENT PLAN OF CARE    Date: 2019  Admission date: 2019  Patient Name: Arlan Baumgarten        MRN: [id number]    : 1945  (74 y.o.)  Gender: female   Rehab diagnosis/surgery with date:  Traumatic brain injury post fall, subdural , no surgical intervention  Impairment Group Code:  2.22      MEDICAL/FUNCTIONAL HISTORY/STATUS:  underlying myeloproliferative syndrome, chronic A-fib, gets regular transfusions monthly    Consultations/Labs/X-rays: blood count dropping, 6.7 and 21.4 today for Hgb and Hct, will get transfused , hematology has seen, edema in lowers present, sodium 128      MEDICATION UPDATE:  no changes      NURSING FIMS:    Bowel:   Current level: Modified independent  Short term bowel goal:  Modified independent  Long term bowel goal: Modified independent    Bladder:   Current level: independent  Short term bladder goal: independent  Long term bladder goal: independent    Toilet Hygiene:   Current level : supervision  Short term Toilet hygiene goal: Modified independent  Long term toilet hygiene goal:  independent    Skin integrity: multiple bruises, integrity is poor, multiplt abrasions  Pain: back pain, chronic    NUTRITION    Diet  general, fluid restriction of  1500cc , Na+  has been low  Liquid consistency   thin    SOCIAL INFORMATION:  Lives with: alone  Prior community services:  none  Home Architecture:  3rd floor apt, elevator, had home health  Prior Level of function:  independent, driving, occasionally  used wheeled walker, used a rollator  DME:  rollator, wheelchair, shower chair, elevated toilet seat, lift chair and step stool     FAMILY / PATIENT EDUCATION:  safety and self care has been ongoing, daughter has been her daily    PHYSICAL THERAPY    Bed mobility:   Current level: standby assist-min assist, sit to supine is mod assist  Short term bed mobility goal: supervision  Long term bed mobility goal: Modified independent    Chair/bed transfers:  Current level: min assist-standby assist  Short term Chair/bed transfers goal: standby assist  Long term Chair/bed transfers goal: Modified independent      Ambulation:   Current level: 125 ft wheeled walker at standby assist, needed rest break  Short term ambulation goal: supervision  Long term ambulation goal: 300 ft.  at Modified independent      Car transfers:   Current level: min assist  Short term car transfers goal: supervision  Long term car transfers goal:Modified independent    Stairs:   Current level : 3 with 2 rails at min assist  Short term stairs goal: 4 at standby assist  Long term stairs goal: 4 at supervision      OCCUPATIONAL THERAPY:      Tub/shower:   Current level: min assist to extended tub bench  Short term tub/shower goal: standby assist  Long term tub/shower goal: standby assist      Feeding:  Current level: min assist  Short term feeding goal: supervision  Long term feeding goal: Modified independent    Grooming:   Current level: min assist, fatigues easily  Short term grooming goal: Contact guard assist  Long term grooming goal: Modified independent    Bathing:  Current level: mod assist  Short term bathing goal: min assist  Long term bathing goal: standby assist    Homemaking:   Current level: to be assessed    Upper body dressing:  Current level: supervision  Short term upper body dressing goal: Modified independent  Long term upper body dressing goal: independent    Lower body dressing:  Current level: mod assist with adaptive equipment  Short term lower body dressing goal: min assist  Long term lower body dressing goal: Modified independent    Toilet transfer:   Current level: min assist with wheeled walker  Short term toilet transfer goal: Contact guard assist  Long term toilet transfer goal: Modified independent      SPEECH THERAPY    Comprehension:   Current level: supervision  Short term comprehension goal: Modified

## 2019-06-18 NOTE — CARE COORDINATION
Per team LOS 7-10 days. Daughter has been at bedside daily. Social work met with patient and  Daughter this date;    Goals :  Improve endurance safety and mobility . HGB low will transfuse today. Improving to min levels this week     DC Plan  Patient and family aware of need for 24 hour supervision. Discussed private duty, Passport services and eligibility for continuing care short term in SNF. Provided SNF list.  Family is meeting this date to discus options. Long term they are planning that patient should relocate to live with one of them. Juan Alberto Craft

## 2019-06-19 LAB
ANION GAP SERPL CALCULATED.3IONS-SCNC: 15 MMOL/L (ref 7–16)
BUN BLDV-MCNC: 53 MG/DL (ref 8–23)
CALCIUM SERPL-MCNC: 8.1 MG/DL (ref 8.6–10.2)
CHLORIDE BLD-SCNC: 93 MMOL/L (ref 98–107)
CO2: 24 MMOL/L (ref 22–29)
CREAT SERPL-MCNC: 1.3 MG/DL (ref 0.5–1)
GFR AFRICAN AMERICAN: 49
GFR NON-AFRICAN AMERICAN: 40 ML/MIN/1.73
GLUCOSE BLD-MCNC: 90 MG/DL (ref 74–99)
HCT VFR BLD CALC: 24.2 % (ref 34–48)
HEMOGLOBIN: 7.8 G/DL (ref 11.5–15.5)
MCH RBC QN AUTO: 29 PG (ref 26–35)
MCHC RBC AUTO-ENTMCNC: 32.2 % (ref 32–34.5)
MCV RBC AUTO: 90 FL (ref 80–99.9)
PDW BLD-RTO: 15.8 FL (ref 11.5–15)
PLATELET # BLD: 688 E9/L (ref 130–450)
PMV BLD AUTO: 12 FL (ref 7–12)
POTASSIUM SERPL-SCNC: 4.5 MMOL/L (ref 3.5–5)
RBC # BLD: 2.69 E12/L (ref 3.5–5.5)
SODIUM BLD-SCNC: 132 MMOL/L (ref 132–146)
WBC # BLD: 20.6 E9/L (ref 4.5–11.5)

## 2019-06-19 PROCEDURE — 92508 TX SP LANG VOICE COMM GROUP: CPT

## 2019-06-19 PROCEDURE — 97530 THERAPEUTIC ACTIVITIES: CPT

## 2019-06-19 PROCEDURE — 97110 THERAPEUTIC EXERCISES: CPT

## 2019-06-19 PROCEDURE — 97535 SELF CARE MNGMENT TRAINING: CPT

## 2019-06-19 PROCEDURE — 2700000000 HC OXYGEN THERAPY PER DAY

## 2019-06-19 PROCEDURE — 97127 HC SP THER IVNTJ W/FOCUS COG FUNCJ: CPT

## 2019-06-19 PROCEDURE — 36415 COLL VENOUS BLD VENIPUNCTURE: CPT

## 2019-06-19 PROCEDURE — 6370000000 HC RX 637 (ALT 250 FOR IP): Performed by: PHYSICAL MEDICINE & REHABILITATION

## 2019-06-19 PROCEDURE — 1280000000 HC REHAB R&B

## 2019-06-19 PROCEDURE — 85027 COMPLETE CBC AUTOMATED: CPT

## 2019-06-19 PROCEDURE — 80048 BASIC METABOLIC PNL TOTAL CA: CPT

## 2019-06-19 PROCEDURE — 2580000003 HC RX 258: Performed by: PHYSICAL MEDICINE & REHABILITATION

## 2019-06-19 RX ORDER — BUMETANIDE 1 MG/1
1 TABLET ORAL 2 TIMES DAILY
Status: DISCONTINUED | OUTPATIENT
Start: 2019-06-19 | End: 2019-06-20

## 2019-06-19 RX ADMIN — DILTIAZEM HYDROCHLORIDE 180 MG: 180 CAPSULE, EXTENDED RELEASE ORAL at 08:56

## 2019-06-19 RX ADMIN — Medication 10 ML: at 20:49

## 2019-06-19 RX ADMIN — OXYCODONE HYDROCHLORIDE 5 MG: 5 TABLET ORAL at 20:49

## 2019-06-19 RX ADMIN — FERROUS SULFATE TAB 325 MG (65 MG ELEMENTAL FE) 325 MG: 325 (65 FE) TAB at 08:56

## 2019-06-19 RX ADMIN — BUMETANIDE 1 MG: 1 TABLET ORAL at 17:13

## 2019-06-19 RX ADMIN — OXYCODONE HYDROCHLORIDE 5 MG: 5 TABLET ORAL at 08:56

## 2019-06-19 RX ADMIN — Medication 10 ML: at 09:12

## 2019-06-19 RX ADMIN — Medication 5 DROP: at 17:13

## 2019-06-19 RX ADMIN — ACETAMINOPHEN 650 MG: 325 TABLET, FILM COATED ORAL at 01:44

## 2019-06-19 RX ADMIN — BUMETANIDE 1 MG: 1 TABLET ORAL at 08:56

## 2019-06-19 RX ADMIN — DIGOXIN 125 MCG: 125 TABLET ORAL at 08:56

## 2019-06-19 ASSESSMENT — PAIN SCALES - GENERAL
PAINLEVEL_OUTOF10: 3
PAINLEVEL_OUTOF10: 0
PAINLEVEL_OUTOF10: 0
PAINLEVEL_OUTOF10: 8
PAINLEVEL_OUTOF10: 9
PAINLEVEL_OUTOF10: 0

## 2019-06-19 ASSESSMENT — PAIN DESCRIPTION - DESCRIPTORS: DESCRIPTORS: ACHING;CONSTANT;DISCOMFORT

## 2019-06-19 ASSESSMENT — PAIN DESCRIPTION - ORIENTATION: ORIENTATION: LOWER

## 2019-06-19 ASSESSMENT — PAIN - FUNCTIONAL ASSESSMENT: PAIN_FUNCTIONAL_ASSESSMENT: PREVENTS OR INTERFERES SOME ACTIVE ACTIVITIES AND ADLS

## 2019-06-19 ASSESSMENT — PAIN DESCRIPTION - LOCATION: LOCATION: BACK

## 2019-06-19 ASSESSMENT — PAIN DESCRIPTION - PAIN TYPE: TYPE: CHRONIC PAIN

## 2019-06-19 ASSESSMENT — PAIN DESCRIPTION - FREQUENCY: FREQUENCY: CONTINUOUS

## 2019-06-19 ASSESSMENT — PAIN DESCRIPTION - ONSET: ONSET: ON-GOING

## 2019-06-19 ASSESSMENT — PAIN DESCRIPTION - PROGRESSION: CLINICAL_PROGRESSION: NOT CHANGED

## 2019-06-19 NOTE — PROGRESS NOTES
Occupational Therapy     06/19/19 1255   Attendance   Activity Games   Participation Active participation   [place] Demonstrates ability to complete social goals   Social Skills Does not initiate conversation or social interaction   Leisure Education Demonstrates knowledge of benefits of leisure involvement  (Identified memory as a benefit.)   Time Spent With Patient   Minutes 30

## 2019-06-19 NOTE — PROGRESS NOTES
Occupational Therapy  OCCUPATIONAL THERAPY DAILY NOTE    Date:2019  Patient Name: Tiffanie Chester  MRN: [id number]  : 1945  Room: [address] [place] [address]     Diagnosis: S/P Fall with SDH, Lacerations to Head and R Elbow     Precautions: falls, O2 2 liters     Functional Assessment:   Date Status AE  Comments   Feeding 19  Minimal Assist      Grooming 19  Min A     Bathing 19  Moderate Assist      UB Dressing 19  Set-up     LB Dressing 19  Moderate Assist Reacher  Pt donned hospital pants at  [address] [place] [address] with reacher. Mod A to don slipper socks due to level of edema in legs and feet    Homemaking 19  TBA        Functional Transfers / Balance:   Date Status DME  Comments   Sit Balance 19  Supervision   During LB dressing    Stand Balance 19  CGA  Table top level    [] Tub  [x] Shower   Transfer 19  Min assist Shower chair  Grab bars    Commode   Transfer    toileting  19   Minimal Assist       Moderate Assist  Grab rail     Functional   Mobility 19   Minimal Assist  ww  rollator    Other:  Sit<>stand         W/c to bed     19    MIN A         Min assist   Table top and ww level       Functional Exercises / Activity:  Pt participated in leisure activity with focus on UB ROM, fine motor coordination, direction following, and standing balance/endurance. Pt completed activity at CGA/SBA level. Pt able to stand 5 mins x 2 reps at table top level   BUE ROM and strength exercises : wheel and medium resistive theraputty on table top surface                                                            Red can do bar 3 sets 10 reps   B hand strength with 3 # digi flex 3 sets 10 reps           Sensory / Neuromuscular Re-Education:      Cognitive Skills:   Status Comments   Problem   Solving fair  vc's for hand/trunk placement on/off commode and shower chair.     Memory fair  \"   Sequencing fair  \"   Safety fair  vc's for safety to enter/exit shower stall using rollator w. walker     Visual Perception:    Education:  Pt was educated on AE for LB dressing     [] Family teach completed on:    6/17/19: daughter present but did not participate in ADL. Pain Level: no complaints of pain on this date     Additional Notes:   6/17/19: requires motivation to participate in ADLs and self challenge     Patient has made fair  progress during treatment sessions toward set goals. Therapy emphasis to obtain goals:Current Treatment Recommendations: Strengthening, Gait Training, Safety Education & Training, Balance Training, Patient/Caregiver Education & Training, Self-Care / ADL, Functional Mobility Training, Neuromuscular Re-education, Equipment Evaluation, Education, & procurement, Home Management Training, Endurance Training, Cognitive Reorientation       [x] Continue with current OT Plan of care.   [] Prepare for Discharge     DISCHARGE RECOMMENDATIONS  Recommended DME:    Post Discharge Care:   []Home Independently  []Home with 24hr Care / Supervision [x]Home with Partial Supervision []Home with Home Health OT []Home with Out Pt OT []Other: ___   Comments:         Time in Time out Tx Time Breakdown  Variance:   First Session  10:45 11:30  [x] Individual Tx-45  [] Concurrent Tx -  [] Co-Tx -   [] Group Tx -   [] Time Missed -      Second Session 2:30  3:15 [x] Individual Tx- 45  [] Concurrent Tx -   [] Co-Tx -   [] Group Tx -   [] Time Missed -     Third Session    [] Individual Tx-   [] Concurrent Tx -  [] Co-Tx -   [] Group Tx -   [] Time Missed -         Total Tx Time :90 mins    Vicky Smith OTR/L [id number]

## 2019-06-19 NOTE — PROGRESS NOTES
Transfers / Balance:      Date Status DME  Comments   Sit Balance 6/1719  Supervision        Stand Balance 6/17/19  Min A rollator w. walker     [] Tub  [x] Shower   Transfer 6/18/19  Min assist Shower chair  Grab bars Verbal cues for safe technique    Commode   Transfer     toileting  6/17/19 6/17/19   Minimal Assist         Moderate Assist  Grab rail      Functional   Mobility 6/18/19   Minimal Assist  ww  rollator Short distance in pt's room with ww    Other:  Sit<>stand            W/c to bed      6/18/19 6/18/19     MIN A            Min assist    HHA  Grab bar/rollator walker             Stand pivot transfer with assist to lift BLE;'s onto bed             Assessment:    Condition: In stable condition. Improving.   (TBI). Plan:   Encourage ambulation. (Had transfusion yesterday  I am going to decrease the Bumex to 1 mg  She still has edema in her lowers  She does not like compression  Gradually improving in therapy  Will recheck lab work on Friday).        Lizeth Epstein MD  6/19/2019

## 2019-06-19 NOTE — PROGRESS NOTES
Blood and [address] [place] [address]  Hematology/Oncology      Patient Name: Neel Quintero  YOB: 1945  PCP: No primary care provider on file. Referring Provider: Antonella Bailey [id number]     Subjective:   Remains very weak but is alert and expresses understanding of the plan    ROS:  No f/c/s  No cp, palp  No cough, sob  No n/v/d      History of Present Illness: This pt is a very pleasant 67 yo female who initially had a fall at home, taken to a local hospital and transferred to [facility] where she was diagnosed with a subdural hematoma. She did not undergo surgery and was transferred here for rehab. She has a history of MF and appears to be a good historian, stating she follows with Dr. Corinne Merlos at [facility] and is on Jakafi 10 mg PO BID which she has been on for at least a few years. She was tried on ZULEIMA support initially but did not receive any benefit. She states her WBC and platelet count are generally well controlled. Hematology has been asked to provide consultation in regards to her MF    Diagnostic Data:     History reviewed. No pertinent past medical history. Patient Active Problem List    Diagnosis Date Noted    Moderate protein-calorie malnutrition ([facility] 06/16/2019    Traumatic brain injury of unknown intent ([facility] 06/13/2019        History reviewed. No pertinent surgical history. Family History  History reviewed. No pertinent family history. Social History  No illicit drug use    TOBACCO:   reports that she has never smoked. She has never used smokeless tobacco.  ETOH:   reports that she drinks alcohol. Home Medications  Prior to Admission medications    Medication Sig Start Date End Date Taking?  Authorizing Provider   alendronate (FOSAMAX) 70 MG tablet Take 70 mg by mouth daily   Yes Historical Provider, MD   allopurinol (ZYLOPRIM) 100 MG tablet Take 100 mg by mouth 2 times daily Indications: take 2 tablets   Yes Historical Provider, MD   ascorbic acid (VITAMIN C) 500 MG tablet Take 500 mg by mouth daily 6/13/19 6/21/19 Yes Historical Provider, MD   bumetanide (BUMEX) 1 MG tablet Take 2 mg by mouth 2 times daily   Yes Historical Provider, MD   Cholecalciferol (VITAMIN D3) 2000 units CAPS Take 2,000 Units by mouth daily   Yes Historical Provider, MD   digoxin (LANOXIN) 125 MCG tablet Take 125 mcg by mouth daily   Yes Historical Provider, MD   diltiazem (CARDIZEM CD) 120 MG extended release capsule Take 120 mg by mouth daily   Yes Historical Provider, MD   ferrous sulfate 325 (65 Fe) MG tablet Take 325 mg by mouth daily (with breakfast)   Yes Historical Provider, MD   ruxolitinib phosphate (JAKAFI) 10 MG tablet Take by mouth 2 times daily   Yes Historical Provider, MD   magnesium oxide (MAG-OX) 400 MG tablet Take 400 mg by mouth daily   Yes Historical Provider, MD   metolazone (ZAROXOLYN) 5 MG tablet Take 5 mg by mouth 2 times daily   Yes Historical Provider, MD   oxyCODONE 5 MG capsule Take 5 mg by mouth 2 times daily. Yes Historical Provider, MD   pantoprazole (PROTONIX) 40 MG tablet Take 40 mg by mouth daily   Yes Historical Provider, MD   phenol 1.4 % LIQD mouth spray Take 2 sprays by mouth every hour as needed for Sore Throat   Yes Historical Provider, MD   sodium bicarbonate 650 MG tablet Take 650 mg by mouth 2 times daily   Yes Historical Provider, MD   sodium chloride (OCEAN, BABY AYR) 0.65 % nasal spray 1 spray by Nasal route 4 times daily as needed for Congestion   Yes Historical Provider, MD       Allergies  Allergies   Allergen Reactions    Metoprolol Tartrate [Metoprolol]        Review of Systems:    Constitutional:  No fever chills or rigors. + fatigue  Eyes: No changes in vision, discharge, or pain  ENT: No Headaches, hearing loss or vertigo. No mouth sores or sore throat. No change in taste or smell. Cardiovascular: No chest discomfort, dyspnea on exertion, palpitations or loss of consciousness. or phlebitis.    Respiratory: Has no cough or wheezing, Has no BASOPCT 0.0 06/14/2019    NEUTROABS 18.73 (H) 06/14/2019    LYMPHSABS 2.90 06/14/2019    MONOSABS 0.45 06/14/2019    EOSABS 0.00 (L) 06/14/2019    BASOSABS 0.00 06/14/2019       Lab Results   Component Value Date     06/19/2019    K 4.5 06/19/2019    CL 93 (L) 06/19/2019    CO2 24 06/19/2019    BUN 53 (H) 06/19/2019    CREATININE 1.3 (H) 06/19/2019    GLUCOSE 90 06/19/2019    CALCIUM 8.1 (L) 06/19/2019    LABGLOM 40 06/19/2019    GFRAA 49 06/19/2019       No results found for: IRON, TIBC, FERRITIN        Radiology-    No orders to display         ASSESSMENT/PLAN :  67 yo female  Admitted for further rehab s/p fall and subdural hematoma not requiring surgical intervention  Myelofibrosis    - CBC reviewed, consistent with MF  - Per pt, her platelet and WBC counts are normally well controlled.  Current elevations likely reactive in nature from trauma and may continue to down trend  - Would monitor her CBC daily  - Continue Jakafi 10 mg PO BID  - Would not add hydrea at this time as her counts may continue to improve on their own  - Transfuse for Hgb <7  - Hgb improved s/p 1 unit from 6.7 to 7.8  - Thrombocytosis continues to down trend      Electronically signed by Sha Otero MD on 6/19/2019 at 10:24 AM

## 2019-06-19 NOTE — PROGRESS NOTES
Speech Language Pathology  ACUTE REHABILITATION -- DAILY PROGRESS NOTE                 SWALLOWING:  Current level: MODIFIED INDEPENDENT    Diet: Regular consistency solids and thin consistency liquids      LANGUAGE:        RECEPTIVE:  Current FIM level: SUPERVISION      Short term FIM goal:        Long term FIM goal: MODIFIED INDEPENDENT         EXPRESSIVE:   Current FIM level: SUPERVISION      Short term FIM goal:       Long term FIM goal:  MODIFIED INDEPENDENT    Pt expressed wants and needs appropriately. Co- treatment with recreational therapist and OT. Followed directions during the activity with good ability and mod cuing. Interacted during the group with good ability. Pt was engaged and participating throughout the activity. COGNITION       PROBLEM SOLVING: Current FIM level: MIN ASSISTANCE/MOD ASSISTANCE      Short term FIM goal: MIN ASSISTANCE      Long term FIM goal: SUPERVISION         MEMORY:    Current FIM level: MIN ASSISTANCE/MOD ASSISTANCE      Short term FIM goal:  MIN ASSISTANCE      Long term FIM goal:  SUPERVISION       SAFETY/INSIGHT:     Co-treatment with recreational therapist and OT. Pt demonstrated good STM and working memory throughout the activity. Pt filled in a check with 100% accuracy. Pt completed functional check balancing activity with fair ability and mod cuing. Pt cued to use a calculator to improve accuracy for mathematics. SPEECH INTELLIGIBILITY:  Good intelligibility for conversational speech.        Janusz6 S Walker Pathologist Student Intern  6/19/2019    Three Hour Rule Tracking:    Individual therapy:            30 minutes    Concurrent therapy:  0 minutes  Group therapy:            30 minutes [id number]  Co-treatment therapy:  0 Minutes     Total minutes: 60 minutes

## 2019-06-20 ENCOUNTER — APPOINTMENT (OUTPATIENT)
Dept: GENERAL RADIOLOGY | Age: 74
DRG: 949 | End: 2019-06-20
Attending: PHYSICAL MEDICINE & REHABILITATION
Payer: MEDICARE

## 2019-06-20 LAB
BACTERIA: ABNORMAL /HPF
BILIRUBIN URINE: NEGATIVE
BLOOD, URINE: NEGATIVE
CLARITY: CLEAR
COLOR: YELLOW
EPITHELIAL CELLS, UA: ABNORMAL /HPF
GLUCOSE URINE: NEGATIVE MG/DL
KETONES, URINE: NEGATIVE MG/DL
LEUKOCYTE ESTERASE, URINE: ABNORMAL
NITRITE, URINE: NEGATIVE
PH UA: 5 (ref 5–9)
PROTEIN UA: ABNORMAL MG/DL
RBC UA: ABNORMAL /HPF (ref 0–2)
SPECIFIC GRAVITY UA: 1.01 (ref 1–1.03)
UROBILINOGEN, URINE: 0.2 E.U./DL
WBC UA: ABNORMAL /HPF (ref 0–5)
YEAST: ABNORMAL

## 2019-06-20 PROCEDURE — 87088 URINE BACTERIA CULTURE: CPT

## 2019-06-20 PROCEDURE — 81001 URINALYSIS AUTO W/SCOPE: CPT

## 2019-06-20 PROCEDURE — 6370000000 HC RX 637 (ALT 250 FOR IP): Performed by: PHYSICAL MEDICINE & REHABILITATION

## 2019-06-20 PROCEDURE — 97127 HC SP THER IVNTJ W/FOCUS COG FUNCJ: CPT

## 2019-06-20 PROCEDURE — 87040 BLOOD CULTURE FOR BACTERIA: CPT

## 2019-06-20 PROCEDURE — 94640 AIRWAY INHALATION TREATMENT: CPT

## 2019-06-20 PROCEDURE — 97110 THERAPEUTIC EXERCISES: CPT

## 2019-06-20 PROCEDURE — 36415 COLL VENOUS BLD VENIPUNCTURE: CPT

## 2019-06-20 PROCEDURE — 2580000003 HC RX 258: Performed by: PHYSICAL MEDICINE & REHABILITATION

## 2019-06-20 PROCEDURE — 71046 X-RAY EXAM CHEST 2 VIEWS: CPT

## 2019-06-20 PROCEDURE — 2700000000 HC OXYGEN THERAPY PER DAY

## 2019-06-20 PROCEDURE — 97530 THERAPEUTIC ACTIVITIES: CPT

## 2019-06-20 PROCEDURE — 1280000000 HC REHAB R&B

## 2019-06-20 RX ORDER — IPRATROPIUM BROMIDE AND ALBUTEROL SULFATE 2.5; .5 MG/3ML; MG/3ML
1 SOLUTION RESPIRATORY (INHALATION)
Status: DISCONTINUED | OUTPATIENT
Start: 2019-06-20 | End: 2019-06-25 | Stop reason: HOSPADM

## 2019-06-20 RX ORDER — BUMETANIDE 1 MG/1
1 TABLET ORAL ONCE
Status: COMPLETED | OUTPATIENT
Start: 2019-06-20 | End: 2019-06-20

## 2019-06-20 RX ORDER — BUMETANIDE 1 MG/1
2 TABLET ORAL 2 TIMES DAILY
Status: DISCONTINUED | OUTPATIENT
Start: 2019-06-20 | End: 2019-06-25 | Stop reason: HOSPADM

## 2019-06-20 RX ADMIN — BUMETANIDE 1 MG: 1 TABLET ORAL at 10:54

## 2019-06-20 RX ADMIN — IPRATROPIUM BROMIDE AND ALBUTEROL SULFATE 1 AMPULE: .5; 3 SOLUTION RESPIRATORY (INHALATION) at 17:31

## 2019-06-20 RX ADMIN — Medication 5 DROP: at 21:43

## 2019-06-20 RX ADMIN — DILTIAZEM HYDROCHLORIDE 180 MG: 180 CAPSULE, EXTENDED RELEASE ORAL at 10:53

## 2019-06-20 RX ADMIN — DIGOXIN 125 MCG: 125 TABLET ORAL at 10:54

## 2019-06-20 RX ADMIN — BUMETANIDE 2 MG: 1 TABLET ORAL at 18:09

## 2019-06-20 RX ADMIN — OXYCODONE HYDROCHLORIDE 5 MG: 5 TABLET ORAL at 10:56

## 2019-06-20 RX ADMIN — OXYCODONE HYDROCHLORIDE 5 MG: 5 TABLET ORAL at 21:43

## 2019-06-20 RX ADMIN — Medication 10 ML: at 11:15

## 2019-06-20 RX ADMIN — ALLOPURINOL 200 MG: 100 TABLET ORAL at 10:52

## 2019-06-20 RX ADMIN — Medication 10 ML: at 21:45

## 2019-06-20 RX ADMIN — FERROUS SULFATE TAB 325 MG (65 MG ELEMENTAL FE) 325 MG: 325 (65 FE) TAB at 10:55

## 2019-06-20 RX ADMIN — BUMETANIDE 1 MG: 1 TABLET ORAL at 14:21

## 2019-06-20 ASSESSMENT — PAIN DESCRIPTION - ORIENTATION
ORIENTATION: RIGHT
ORIENTATION: LOWER

## 2019-06-20 ASSESSMENT — PAIN - FUNCTIONAL ASSESSMENT: PAIN_FUNCTIONAL_ASSESSMENT: PREVENTS OR INTERFERES SOME ACTIVE ACTIVITIES AND ADLS

## 2019-06-20 ASSESSMENT — PAIN SCALES - GENERAL
PAINLEVEL_OUTOF10: 0
PAINLEVEL_OUTOF10: 10
PAINLEVEL_OUTOF10: 0
PAINLEVEL_OUTOF10: 0
PAINLEVEL_OUTOF10: 5
PAINLEVEL_OUTOF10: 8

## 2019-06-20 ASSESSMENT — PAIN DESCRIPTION - ONSET
ONSET: ON-GOING
ONSET: ON-GOING

## 2019-06-20 ASSESSMENT — PAIN DESCRIPTION - LOCATION
LOCATION: ELBOW
LOCATION: BACK

## 2019-06-20 ASSESSMENT — PAIN DESCRIPTION - PAIN TYPE
TYPE: CHRONIC PAIN
TYPE: ACUTE PAIN

## 2019-06-20 ASSESSMENT — PAIN DESCRIPTION - FREQUENCY
FREQUENCY: INTERMITTENT
FREQUENCY: CONTINUOUS

## 2019-06-20 ASSESSMENT — PAIN DESCRIPTION - DESCRIPTORS
DESCRIPTORS: ACHING;DISCOMFORT
DESCRIPTORS: ACHING;CONSTANT;DISCOMFORT

## 2019-06-20 ASSESSMENT — PAIN DESCRIPTION - PROGRESSION: CLINICAL_PROGRESSION: NOT CHANGED

## 2019-06-20 NOTE — PROGRESS NOTES
Speech Language Pathology  ACUTE REHABILITATION -- DAILY PROGRESS NOTE                 SWALLOWING:  Current level: MODIFIED INDEPENDENT    Diet: Regular consistency solids and thin consistency liquids      LANGUAGE:        RECEPTIVE:  Current FIM level: SUPERVISION      Short term FIM goal:        Long term FIM goal: MODIFIED INDEPENDENT         EXPRESSIVE:   Current FIM level: SUPERVISION      Short term FIM goal:       Long term FIM goal:  MODIFIED INDEPENDENT    Pt expressed wants and needs appropriately. Pt was engaged and participating throughout the activity. Pt named items in a category for a recipe activity with good ability and min cuing. COGNITION       PROBLEM SOLVING: Current FIM level: MIN ASSISTANCE/MOD ASSISTANCE      Short term FIM goal: MIN ASSISTANCE      Long term FIM goal: SUPERVISION         MEMORY:    Current FIM level: MIN ASSISTANCE/MOD ASSISTANCE      Short term FIM goal:  MIN ASSISTANCE      Long term FIM goal:  SUPERVISION       SAFETY/INSIGHT:     Min level problem solving task involving a electric bill completed with poor ability and mod cuing. Education was provided on the importance of accurately reading a bill to complete finances. Pt was in agreement with the education. Pt sequenced steps to making a recipe with fair-good ability. Pt problem solved functional tasks with fair ability and mod cuing. SPEECH INTELLIGIBILITY:  Good intelligibility for conversational speech.        Janusz6 S Walker Pathologist Student Intern  6/20/2019    Three Hour Rule Tracking:    Individual therapy:              0 minutes   Concurrent therapy:  30minutes [id number]  Group therapy:   0 minutes   Co-treatment therapy:  0 Minutes     Total minutes: 30 minutes

## 2019-06-20 NOTE — PROGRESS NOTES
Occupational Therapy  OCCUPATIONAL THERAPY DAILY NOTE    Date:2019  Patient Name: Enid Martell  MRN: [id number]  : 1945  Room: [address] [place] [address]     Diagnosis: S/P Fall with SDH, Lacerations to Head and R Elbow     Precautions: falls, O2 2 liters     Functional Assessment:   Date Status AE  Comments   Feeding 19  Minimal Assist      Grooming 19  Min A     Bathing 19  Moderate Assist      UB Dressing 19  Set-up     LB Dressing 19  Moderate Assist Reacher      Homemaking 19  TBA        Functional Transfers / Balance:   Date Status DME  Comments   Sit Balance 19  Supervision       Stand Balance 19  CGA  Table top level    [] Tub  [x] Shower   Transfer 19  Min assist Shower chair  Grab bars    Commode   Transfer    toileting  19   Minimal Assist       Moderate Assist  Grab rail     Functional   Mobility 19   Minimal Assist  ww  rollator    Other:  Sit<>stand         W/c to bed     19    MIN A         Min assist   Table top and ww level       Functional Exercises / Activity:   BUE ROM and strength exercise with 2 # weighted ball 3 sets 10 reps in planes of pt's tolerance   B hand strength/gross motor coordination with graded clothespin activity           Sensory / Neuromuscular Re-Education:      Cognitive Skills:   Status Comments   Problem   Solving fair  vc's for hand/trunk placement on/off commode and shower chair. Memory fair  \"   Sequencing fair  \"   Safety fair  vc's for safety to enter/exit shower stall using rollator w. walker     Visual Perception:    Education:  Pt was educated on pacing and deep breathing techniques     [] Family teach completed on:    19: daughter present but did not participate in ADL.     Pain Level: no complaints of pain on this date     Additional Notes:   19: requires motivation to participate in ADLs and self challenge   19 pt needed frequent rest breaks during tx session on this date due to level of fatigue     Patient has made fair  progress during treatment sessions toward set goals. Therapy emphasis to obtain goals:Current Treatment Recommendations: Strengthening, Gait Training, Safety Education & Training, Balance Training, Patient/Caregiver Education & Training, Self-Care / ADL, Functional Mobility Training, Neuromuscular Re-education, Equipment Evaluation, Education, & procurement, Home Management Training, Endurance Training, Cognitive Reorientation       [x] Continue with current OT Plan of care.   [] Prepare for Discharge     DISCHARGE RECOMMENDATIONS  Recommended DME:    Post Discharge Care:   []Home Independently  []Home with 24hr Care / Supervision [x]Home with Partial Supervision []Home with Home Health OT []Home with Out Pt OT []Other: ___   Comments:         Time in Time out Tx Time Breakdown  Variance:   First Session    [] Individual Tx-  [] Concurrent Tx -  [] Co-Tx -   [] Group Tx -   [x] Time Missed -45           Pt declined due to being too tired and having just returned from an xray    Second Session 2:45 3:15 [x] Individual Tx- 30  [] Concurrent Tx -   [] Co-Tx -   [] Group Tx -   [x] Time Missed -15          Pt fatigued    Third Session    [] Individual Tx-   [] Concurrent Tx -  [] Co-Tx -   [] Group Tx -   [] Time Missed -         Total Tx Time :[facility] OTR/L [id number]

## 2019-06-20 NOTE — PROGRESS NOTES
Physical Therapy    Facility/Department: [facility]  Treatment Note    NAME: Shashank Nunez  : 1945  MRN: [id number]    Date of Service: 2019    Evaluating Therapist: Nga Sánchez DPT    ROOM: 7478/3839-T  DIAGNOSIS: TBI  PMH: Pt found down in residence on 19 with facial wound. CT of head demonstrated L SDH with shift (from assumed fall). L frontal facial laceration s/p repair noted during hospitalization. Intubated upon arrival to hospital. PMH includes myelofibrosis, CHF, AFIB, GERG, pacemaker, CHF. PRECAUTIONS: fluid restriction, falls, dysphagia     Social:  Pt lives alone in a single floor Sr. high rise/independent living facility (3rd floor apartment with elevator access). PTA, pt used a rollator for mobility, w/c, ww, lift chair. [facility] RN 2x a week from [facility] located in [place], [place]     Initial Evaluation  19 AM     PM  Short Term Goals Long Term Goals    Was pt agreeable to Eval/treatment? yes Yes, with encouragement  Yes      Does pt have pain?  C/o mild HA/ R arm pain None  None      Bed Mobility  Rolling: sba  Supine to sit: cg/sba  Sit to supine: Felipe  Scooting: sba    NT sup Mod I   Transfers Sit to stand: cgA  Stand to sit: cgA  Stand pivot: cgA with ww Sit to stand min A  Stand to sit min A   Sit to stand Felipe  Stand to stand min A   sba with AAD  sup with AAD   Ambulation    30 feet with ww with cg/sba 10 feet with ww with min A 75 feet and 25 feet with ww with  feet with AAD with sup  150 feet with AAD with  sup    Walking 10 feet on uneven surface NT NT NT     Wheel Chair Mobility NT NT NT     Car Transfers NT NT NT sup  sup    Stair negotiation: ascended and descended  1 steps with bilateral rail with Felipe NT NT  4 steps with bilateral rail with sba 4 steps with bilateral rail with  Felipe   Curb Step:   ascended and descended 4 inch step with ww and Felipe NT NT 4 inch step with AAD and sba 4 inch step with AAD and  Felipe   Picking up object off the floor NT       BLE ROM WFl       BLE Strength Bilateral Hips 3+/5, knees/ankles 4/5       Balance  Sitting: sup  Standing: cgA with ww       Date Family Teach Completed TBA (dtr present on eval)       Is additional Family Teaching Needed? Y or N Y       Hindering Progress Balance/neuropathy, endurance/activity tolerance       PT recommended ELOS 7-10 days       Team's Discharge Plan        Therapist at Team Meeting          Pt performed therapeutic exercise of the following:   PM    At walker   Marching (x10)   Knee bends ( x10)   Sit <>stand ( x5)     Patient education  Pt was educated on energy conservation     Patient response to education:   Pt verbalized understanding Pt demonstrated skill Pt requires further education in this area   x  x     Additional Comments: Pt required much encouragement with am treatment. Decreased tolerance to activity due to complaints of fatigue. Much encouragement required with ambulation and declined further treatment. Returned pt to room and pivoted pt from wc to bed. Transport arrived to take pt to xray. Assisted pt from bed to wc with min A ( stand pivot. )      PM   Increased time required with all mobility. Frequent rest breaks with activity. Pt returned to room due to bathroom needs and nursing notified. Time in: 1000  Time out: 1015    Time in: 1515  Time out: 1545    Pt is making limited  progress toward established Physical Therapy goals. Continue with physical therapy current plan of care.     Pooja Stiles Number: [id number]    Goals downgraded this date- Krys Lopez DPT

## 2019-06-20 NOTE — PLAN OF CARE
Problem: Pain:  Goal: Pain level will decrease  Description  Pain level will decrease  Outcome: Met This Shift  Goal: Control of acute pain  Description  Control of acute pain  Outcome: Met This Shift     Problem: Falls - Risk of:  Goal: Will remain free from falls  Description  Will remain free from falls  Outcome: Met This Shift  Goal: Absence of physical injury  Description  Absence of physical injury  Outcome: Met This Shift

## 2019-06-20 NOTE — PROGRESS NOTES
Radha Ramirez is a 68 y.o. female patient.     Current Facility-Administered Medications   Medication Dose Route Frequency Provider Last Rate Last Dose    bumetanide (BUMEX) tablet 1 mg  1 mg Oral BID Bhavik Vázquez MD   1 mg at 06/19/19 1713    carbamide peroxide (DEBROX) 6.5 % otic solution 5 drop  5 drop Both Ears BID Zoila Hernandez MD   5 drop at 06/19/19 1713    sodium chloride flush 0.9 % injection 10 mL  10 mL Intravenous BID Zoila Hernandez MD   10 mL at 06/19/19 2049    sodium chloride flush 0.9 % injection 10 mL  10 mL Intravenous PRN Zoila Hernandez MD        diltiazem (CARDIZEM CD) extended release capsule 180 mg  180 mg Oral Daily Zoila Hernandez MD   180 mg at 06/19/19 0856    acetaminophen (TYLENOL) tablet 650 mg  650 mg Oral Q4H PRN Zoila Hernandez MD   650 mg at 06/19/19 0144    magnesium hydroxide (MILK OF MAGNESIA) 400 MG/5ML suspension 30 mL  30 mL Oral Daily PRN Zoila Hernandez MD        acetaminophen (TYLENOL) tablet 500 mg  500 mg Oral 4 times per day Zoila Hernandez MD   500 mg at 06/16/19 0100    allopurinol (ZYLOPRIM) tablet 200 mg  200 mg Oral BID Zoila Hernandez MD   200 mg at 06/14/19 0919    vitamin C (ASCORBIC ACID) tablet 500 mg  500 mg Oral Daily Zoila Hernandez MD        vitamin D tablet 2,000 Units  2,000 Units Oral Daily Zoila Hernandez MD        collagenase ointment   Topical Daily Zoila Hernandez MD        digoxin (LANOXIN) tablet 125 mcg  125 mcg Oral Daily Zoila Hernandez MD   125 mcg at 06/19/19 0856    ferrous sulfate tablet 325 mg  325 mg Oral Daily with breakfast Zoila Hernandez MD   325 mg at 06/19/19 0856    magnesium oxide (MAG-OX) tablet 400 mg  400 mg Oral Daily Bhavik Vázquez MD   400 mg at 06/14/19 0920    oxyCODONE (ROXICODONE) immediate release tablet 5 mg  5 mg Oral BID Zoila Hernandez MD   5 mg at 06/19/19 2049    pantoprazole (PROTONIX) tablet 40 mg  40 mg Oral QAM AC Bhavik Vázquez MD        sodium bicarbonate tablet consider antibiotics).        Amalia Preston MD  6/20/2019

## 2019-06-20 NOTE — CARE COORDINATION
Met with daughter to discuss discharge planning. Family has met and want to look for short term skilled placement at discharge. Their first choice is [facility] in [address] [place] [address]. Second choice is [facility] in [facility]. .  Long term planning issues also discussed and social work provided education and support re options for Assisted  Care, private duty home health.       Application sent this date to admissions at [facility]  Fax: [phone]   Phone: [phone]

## 2019-06-21 LAB
ANION GAP SERPL CALCULATED.3IONS-SCNC: 11 MMOL/L (ref 7–16)
BLOOD BANK DISPENSE STATUS: NORMAL
BLOOD BANK DISPENSE STATUS: NORMAL
BLOOD BANK PRODUCT CODE: NORMAL
BLOOD BANK PRODUCT CODE: NORMAL
BPU ID: NORMAL
BPU ID: NORMAL
BUN BLDV-MCNC: 55 MG/DL (ref 8–23)
CALCIUM SERPL-MCNC: 7.9 MG/DL (ref 8.6–10.2)
CHLORIDE BLD-SCNC: 95 MMOL/L (ref 98–107)
CO2: 25 MMOL/L (ref 22–29)
CREAT SERPL-MCNC: 1.3 MG/DL (ref 0.5–1)
DESCRIPTION BLOOD BANK: NORMAL
DESCRIPTION BLOOD BANK: NORMAL
GFR AFRICAN AMERICAN: 49
GFR NON-AFRICAN AMERICAN: 40 ML/MIN/1.73
GLUCOSE BLD-MCNC: 90 MG/DL (ref 74–99)
HCT VFR BLD CALC: 21.4 % (ref 34–48)
HEMOGLOBIN: 6.8 G/DL (ref 11.5–15.5)
MCH RBC QN AUTO: 28.1 PG (ref 26–35)
MCHC RBC AUTO-ENTMCNC: 31.8 % (ref 32–34.5)
MCV RBC AUTO: 88.4 FL (ref 80–99.9)
PDW BLD-RTO: 16.3 FL (ref 11.5–15)
PLATELET # BLD: 690 E9/L (ref 130–450)
PMV BLD AUTO: 12 FL (ref 7–12)
POTASSIUM SERPL-SCNC: 4.4 MMOL/L (ref 3.5–5)
RBC # BLD: 2.42 E12/L (ref 3.5–5.5)
SODIUM BLD-SCNC: 131 MMOL/L (ref 132–146)
WBC # BLD: 18.8 E9/L (ref 4.5–11.5)

## 2019-06-21 PROCEDURE — 36430 TRANSFUSION BLD/BLD COMPNT: CPT

## 2019-06-21 PROCEDURE — 36415 COLL VENOUS BLD VENIPUNCTURE: CPT

## 2019-06-21 PROCEDURE — 85027 COMPLETE CBC AUTOMATED: CPT

## 2019-06-21 PROCEDURE — 97530 THERAPEUTIC ACTIVITIES: CPT

## 2019-06-21 PROCEDURE — P9016 RBC LEUKOCYTES REDUCED: HCPCS

## 2019-06-21 PROCEDURE — 86923 COMPATIBILITY TEST ELECTRIC: CPT

## 2019-06-21 PROCEDURE — 6370000000 HC RX 637 (ALT 250 FOR IP): Performed by: PHYSICAL MEDICINE & REHABILITATION

## 2019-06-21 PROCEDURE — 1280000000 HC REHAB R&B

## 2019-06-21 PROCEDURE — 94640 AIRWAY INHALATION TREATMENT: CPT

## 2019-06-21 PROCEDURE — 97110 THERAPEUTIC EXERCISES: CPT

## 2019-06-21 PROCEDURE — 97127 HC SP THER IVNTJ W/FOCUS COG FUNCJ: CPT

## 2019-06-21 PROCEDURE — 97535 SELF CARE MNGMENT TRAINING: CPT

## 2019-06-21 PROCEDURE — 80048 BASIC METABOLIC PNL TOTAL CA: CPT

## 2019-06-21 PROCEDURE — 2700000000 HC OXYGEN THERAPY PER DAY

## 2019-06-21 PROCEDURE — 2580000003 HC RX 258: Performed by: PHYSICAL MEDICINE & REHABILITATION

## 2019-06-21 RX ORDER — 0.9 % SODIUM CHLORIDE 0.9 %
250 INTRAVENOUS SOLUTION INTRAVENOUS ONCE
Status: DISCONTINUED | OUTPATIENT
Start: 2019-06-21 | End: 2019-06-25 | Stop reason: HOSPADM

## 2019-06-21 RX ORDER — DIPHENHYDRAMINE HCL 25 MG
25 TABLET ORAL ONCE
Status: COMPLETED | OUTPATIENT
Start: 2019-06-21 | End: 2019-06-21

## 2019-06-21 RX ORDER — ACETAMINOPHEN 325 MG/1
650 TABLET ORAL ONCE
Status: COMPLETED | OUTPATIENT
Start: 2019-06-21 | End: 2019-06-21

## 2019-06-21 RX ADMIN — FERROUS SULFATE TAB 325 MG (65 MG ELEMENTAL FE) 325 MG: 325 (65 FE) TAB at 09:36

## 2019-06-21 RX ADMIN — ACETAMINOPHEN 650 MG: 325 TABLET, FILM COATED ORAL at 18:51

## 2019-06-21 RX ADMIN — OXYCODONE HYDROCHLORIDE 5 MG: 5 TABLET ORAL at 09:36

## 2019-06-21 RX ADMIN — IPRATROPIUM BROMIDE AND ALBUTEROL SULFATE 1 AMPULE: .5; 3 SOLUTION RESPIRATORY (INHALATION) at 08:12

## 2019-06-21 RX ADMIN — DIPHENHYDRAMINE HCL 25 MG: 25 TABLET ORAL at 18:51

## 2019-06-21 RX ADMIN — ALLOPURINOL 200 MG: 100 TABLET ORAL at 09:37

## 2019-06-21 RX ADMIN — BUMETANIDE 2 MG: 1 TABLET ORAL at 17:31

## 2019-06-21 RX ADMIN — IPRATROPIUM BROMIDE AND ALBUTEROL SULFATE 1 AMPULE: .5; 3 SOLUTION RESPIRATORY (INHALATION) at 11:55

## 2019-06-21 RX ADMIN — DILTIAZEM HYDROCHLORIDE 180 MG: 180 CAPSULE, EXTENDED RELEASE ORAL at 09:36

## 2019-06-21 RX ADMIN — Medication 10 ML: at 09:36

## 2019-06-21 RX ADMIN — BUMETANIDE 2 MG: 1 TABLET ORAL at 09:36

## 2019-06-21 RX ADMIN — DIGOXIN 125 MCG: 125 TABLET ORAL at 09:36

## 2019-06-21 RX ADMIN — IPRATROPIUM BROMIDE AND ALBUTEROL SULFATE 1 AMPULE: .5; 3 SOLUTION RESPIRATORY (INHALATION) at 16:24

## 2019-06-21 RX ADMIN — OXYCODONE HYDROCHLORIDE 5 MG: 5 TABLET ORAL at 21:44

## 2019-06-21 ASSESSMENT — PAIN SCALES - GENERAL
PAINLEVEL_OUTOF10: 9
PAINLEVEL_OUTOF10: 0
PAINLEVEL_OUTOF10: 0
PAINLEVEL_OUTOF10: 8
PAINLEVEL_OUTOF10: 0
PAINLEVEL_OUTOF10: 6

## 2019-06-21 ASSESSMENT — PAIN DESCRIPTION - ORIENTATION: ORIENTATION: MID;LOWER

## 2019-06-21 ASSESSMENT — PAIN DESCRIPTION - PAIN TYPE
TYPE: ACUTE PAIN
TYPE: CHRONIC PAIN

## 2019-06-21 ASSESSMENT — PAIN DESCRIPTION - ONSET: ONSET: ON-GOING

## 2019-06-21 ASSESSMENT — PAIN DESCRIPTION - LOCATION
LOCATION: BACK
LOCATION: GENERALIZED

## 2019-06-21 ASSESSMENT — PAIN DESCRIPTION - FREQUENCY: FREQUENCY: INTERMITTENT

## 2019-06-21 ASSESSMENT — PAIN DESCRIPTION - DESCRIPTORS
DESCRIPTORS: ACHING;DISCOMFORT;DULL;SORE
DESCRIPTORS: ACHING

## 2019-06-21 ASSESSMENT — PAIN DESCRIPTION - PROGRESSION: CLINICAL_PROGRESSION: NOT CHANGED

## 2019-06-21 NOTE — CARE COORDINATION
[facility] - [phone] Getachew Garcia. No beds available today. Will need to check on 6/24. SOV - Donita - referral to [facility]. They are unable to accept due to cost of medications. Spoke with daughter Susan Links re:  Above and to request more options in case no bed is available at above. SW will check on 6/24 bed availability to either facility.     Tej Castanon

## 2019-06-21 NOTE — PROGRESS NOTES
Guy Bautista is a 68 y.o. female patient.     Current Facility-Administered Medications   Medication Dose Route Frequency Provider Last Rate Last Dose    ipratropium-albuterol (DUONEB) nebulizer solution 1 ampule  1 ampule Inhalation Q4H WA Reji Coley MD   1 ampule at 06/21/19 0072    bumetanide (BUMEX) tablet 2 mg  2 mg Oral BID Reji Coley MD   2 mg at 06/20/19 1809    carbamide peroxide (DEBROX) 6.5 % otic solution 5 drop  5 drop Both Ears BID Reji Coley MD   5 drop at 06/20/19 2143    sodium chloride flush 0.9 % injection 10 mL  10 mL Intravenous BID Reji Coley MD   10 mL at 06/20/19 2145    sodium chloride flush 0.9 % injection 10 mL  10 mL Intravenous PRN Reji Coley MD        diltiazem (CARDIZEM CD) extended release capsule 180 mg  180 mg Oral Daily Bhavik Vázquez MD   180 mg at 06/20/19 1053    acetaminophen (TYLENOL) tablet 650 mg  650 mg Oral Q4H PRN Reji Coley MD   650 mg at 06/19/19 0144    magnesium hydroxide (MILK OF MAGNESIA) 400 MG/5ML suspension 30 mL  30 mL Oral Daily PRN Reji Coley MD        acetaminophen (TYLENOL) tablet 500 mg  500 mg Oral 4 times per day Reji Coley MD   500 mg at 06/16/19 0100    allopurinol (ZYLOPRIM) tablet 200 mg  200 mg Oral BID Reji Coley MD   200 mg at 06/20/19 1052    vitamin C (ASCORBIC ACID) tablet 500 mg  500 mg Oral Daily Reji Coley MD        vitamin D tablet 2,000 Units  2,000 Units Oral Daily Reji Coley MD        collagenase ointment   Topical Daily Reji Coley MD        digoxin (LANOXIN) tablet 125 mcg  125 mcg Oral Daily Reji Coley MD   125 mcg at 06/20/19 1054    ferrous sulfate tablet 325 mg  325 mg Oral Daily with breakfast Reji Coley MD   325 mg at 06/20/19 1055    magnesium oxide (MAG-OX) tablet 400 mg  400 mg Oral Daily Bhavik Vázquez MD   400 mg at 06/14/19 0920    oxyCODONE (ROXICODONE) immediate release tablet 5 mg  5 mg Oral BID Reji Coley MD 5 mg at 06/20/19 2143    pantoprazole (PROTONIX) tablet 40 mg  40 mg Oral QAM AC Bhavik Vázquez MD        sodium bicarbonate tablet 650 mg  650 mg Oral BID Christ Krueger MD   650 mg at 06/14/19 0920    sodium chloride (OCEAN, BABY AYR) 0.65 % nasal spray 1 spray  1 spray Each Nostril 4x Daily PRN Christ Krueger MD        ruxolitinib phosphate (JAKAFI) 10 MG tablet TABS 10 mg  10 mg Oral BID Christ Krueger MD   10 mg at 06/20/19 2143     Allergies   Allergen Reactions    Metoprolol Tartrate [Metoprolol]      Active Problems:    Traumatic brain injury of unknown intent ([facility] [address]    Moderate protein-calorie malnutrition ([facility] [address]  Resolved Problems:    * No resolved hospital problems. *    Blood pressure (!) 120/51, pulse 81, temperature 98.1 °F (36.7 °C), temperature source Temporal, resp. rate 18, height 5' 3\" (1.6 m), weight 151 lb 11.2 oz (68.8 kg), SpO2 92 %. Subjective:  Symptoms:  Stable. She reports weakness. Diet:  Adequate intake. Activity level: Impaired due to weakness. Pain:  She reports no pain. Objective:  General Appearance: In no acute distress. Vital signs: (most recent): Blood pressure (!) 120/51, pulse 81, temperature 98.1 °F (36.7 °C), temperature source Temporal, resp. rate 18, height 5' 3\" (1.6 m), weight 151 lb 11.2 oz (68.8 kg), SpO2 92 %. Vital signs are normal.    Output: Producing urine and producing stool. Lungs:  Normal effort and normal respiratory rate. Breath sounds clear to auscultation. Heart: Normal rate. Regular rhythm. S1 normal and S2 normal.    Abdomen: Abdomen is soft. Bowel sounds are normal.   There is no abdominal tenderness. Extremities: Normal range of motion. Neurological: Patient is alert. (4/5 str).       Functional Assessment:      Date   Status   AE    Comments     Feeding   6/13/19    Minimal Assist              Grooming   6/13/19    Min A             Bathing   6/17/19    Moderate Assist              UB Dressing   6/17/19 Set-up             LB Dressing   6/19/19    Moderate Assist   Reacher          Homemaking   6/13/19    TBA                   Functional Transfers / Balance:      Date Status DME  Comments   Sit Balance 6/19/19  Supervision        Stand Balance 6/19/19  CGA  Table top level     [] Tub  [x] Shower   Transfer 6/18/19  Min assist Shower chair  Grab bars     Commode   Transfer     toileting  6/17/19 6/17/19   Minimal Assist         Moderate Assist  Grab rail      Functional   Mobility 6/18/19   Minimal Assist  ww  rollator     Other:  Sit<>stand            W/c to bed      6/19/19 6/20/19     MIN A            Min assist    Table top and ww level         Functional Exercises / Activity:        Assessment:    Condition: In stable condition. Improving.   (TBI). Plan:   Encourage ambulation. (Tolerating therapy well  Strength improving  Hemoglobin is down again  She will need a second unit transfused  She usually requires 2 units).        Billie Apley, MD  6/21/2019

## 2019-06-21 NOTE — PROGRESS NOTES
Physical Therapy    Facility/Department: [facility] REHAB  Treatment Note    NAME: Tomasa Velasquez  : 1945  MRN: [id number]    Date of Service: 2019    Evaluating Therapist: Broderick Edwards DPT    ROOM: [address] [place], [place] [id number]  DIAGNOSIS: TBI  PMH: Pt found down in residence on 19 with facial wound. CT of head demonstrated L SDH with shift (from assumed fall). L frontal facial laceration s/p repair noted during hospitalization. Intubated upon arrival to hospital. PMH includes myelofibrosis, CHF, AFIB, GERG, pacemaker, CHF. PRECAUTIONS: fluid restriction, falls, dysphagia     Social:  Pt lives alone in a single floor Sr. high rise/independent living facility (3rd floor apartment with elevator access). PTA, pt used a rollator for mobility, w/c, ww, lift chair. Daniel Roe RN 2x a week from [facility] located in [place], [place]     Initial Evaluation  19 AM     PM  Short Term Goals Long Term Goals    Was pt agreeable to Eval/treatment? yes Yes  Yes      Does pt have pain?  C/o mild HA/ R arm pain None  None      Bed Mobility  Rolling: sba  Supine to sit: cg/sba  Sit to supine: Felipe  Scooting: sba    Supine to sit min A sup Mod I   Transfers Sit to stand: cgA  Stand to sit: cgA  Stand pivot: cgA with ww Sit to stand CGA  Stand to sit min A   Sit to stand Felipe  Stand to stand min A   sba with AAD  sup with AAD   Ambulation    30 feet with ww with cg/sba 75 feet with ww with CGA 10 feet x2 with ww with  feet with AAD with sup  150 feet with AAD with  sup    Walking 10 feet on uneven surface NT NT NT     Wheel Chair Mobility NT NT NT     Car Transfers NT NT NT sup  sup    Stair negotiation: ascended and descended  1 steps with bilateral rail with Felipe NT NT  4 steps with bilateral rail with sba 4 steps with bilateral rail with  Felipe   Curb Step:   ascended and descended 4 inch step with ww and Felipe NT NT 4 inch step with AAD and sba 4 inch step with AAD and  Felipe   Picking up object off the floor NT       BLE ROM WFl       BLE Strength Bilateral Hips 3+/5, knees/ankles 4/5       Balance  Sitting: sup  Standing: cgA with ww       Date Family Teach Completed TBA (dtr present on eval)       Is additional Family Teaching Needed? Y or N Y       Hindering Progress Balance/neuropathy, endurance/activity tolerance       PT recommended ELOS 7-10 days       Team's Discharge Plan        Therapist at Team Meeting          AM  LAQs ( B LE x10)   Marching ( B LE x10)   Patient education  Pt was educated on technique/hand placement with sit <>stand      Patient response to education:   Pt verbalized understanding Pt demonstrated skill Pt requires further education in this area   x x      Additional Comments:   Pt continues to report increased fatigue however minimal improved tolerance to activity. Assisted pt to bathroom due an episode of diarrhea and nursing notified. PM   Pt in bed upon arrival.  Checked O2 saturations which were 77%( not hooked to O2) and nursing notified. Placed pt on O2 on wall at 3 liters of O2. Instructed pt in diaphragmatic breathing and O2 saturations increased to 90-91% after about 5-10 minutes. Assisted pt to bathroom and sat pt in chair. O2 saturations after activity was 97%. Time in: 1000  Time out: 1015    Time in: 1515  Time out: 1545    Pt is making limited  progress toward established Physical Therapy goals. Continue with physical therapy current plan of care.     Pooja Stiles Number: [id number]

## 2019-06-21 NOTE — FLOWSHEET NOTE
Inpatient Wound Care(follow up) 5515    Admit Date: 6/12/2019 10:10 PM    Reason for consult:  Re-evaluation    Findings:       06/21/19 0912   Skin Integrity   Skin Integrity Bruising   Location face, BUE, mid back   Skin Integrity Site 2   Skin Integrity Location 2   (dried scab)   Location 2 right forehead   Wound 06/14/19 Elbow Right   Date First Assessed/Time First Assessed: 06/14/19 1400   Present on Hospital Admission: Yes  Location: (!) Elbow  Wound Location Orientation: Right   Dressing Changed   (cover dressing only)   Dressing/Treatment Non adherent;Dry Dressing   Wound Length (cm) 3 cm   Wound Width (cm) 2 cm   Wound Depth (cm)   (black eschar)   Wound Surface Area (cm^2) 6 cm^2   Change in Wound Size % (l*w) 33.33   Wound Assessment Black   Drainage Amount Scant   Drainage Description Serosanguinous   Black%Wound Bed 100     Plan:  Orders reviewed and updated    Unruly Cuellar 6/21/2019 9:15 AM

## 2019-06-21 NOTE — PROGRESS NOTES
pitting BLE edema noted   · Wound Type: Open Wounds(to elbow )  · Current Nutrition Therapies:  · Oral Diet Orders: General, Fluid Restriction(1200ml )   · Oral Diet intake: 26-50%  · Oral Nutrition Supplement (ONS) Orders: Frozen Oral Supplement  · Anthropometric Measures:  · Ht: 5' 3\" (160 cm)   · Current Body Wt: 151 lb (68.5 kg)(6/19 actual- RIGOBERTO Updated wt 2/2 pt in wheelchair during visit )  · Admission Body Wt: 146 lb (66.2 kg)(6/12 actual on admit )  · Usual Body Wt: (no actual wts per EMR )  · % Weight Change:  ,  wt gain noted since admit- likely 2/2 fluids w/ + I/Os noted   · Ideal Body Wt: 115 lb (52.2 kg), % Ideal Body 130%  · BMI Classification: BMI 25.0 - 29.9 Overweight    Nutrition Interventions:   Continue current diet, Discontinue ONS(add HS snack )  Continued Inpatient Monitoring, Coordination of Care    Nutrition Evaluation:   · Evaluation: No progress toward goals   · Goals: consume 50% or more of most meals/snacks     · Monitoring: Meal Intake, Diet Tolerance, Skin Integrity, I&O, Mental Status/Confusion, Weight, Pertinent Labs, Monitor Bowel Function      Electronically signed by Mechelle Monaco RD, MEGAN on 6/21/19 at 3:19 PM    Contact Number: [phone]

## 2019-06-21 NOTE — PROGRESS NOTES
Occupational Therapy  OCCUPATIONAL THERAPY DAILY NOTE    Date:2019  Patient Name: Arlene Cooper  MRN: [id number]  : 1945  Room: [address] [place], [place] [address]     Diagnosis: S/P Fall with SDH, Lacerations to Head and R Elbow     Precautions: falls, O2 2 liters     Functional Assessment:   Date Status AE  Comments   Feeding 19  Minimal Assist      Grooming 19 CGA/Min A  Pt completed oral care at sink then washed face/hands and combed hair while seated. Bathing 19  UB CGA/min assist  LB Moderate Assist   Pt washed neck, arms, chest however mod assist to wash legs,thighs and perform samaria care due to loose bowels. UB Dressing 19  Set-up  Set up to willis t- shirt   LB Dressing 19  Moderate Assist Reacher  Pt used reacher to willis shorts needing assist to willis socks. EZ slider to willis hose on BLE's needing assist. Therapist donned gripper socks for patient safety and patient easily fatigues. Homemaking 19  CGA   Pt completed laundry folding seated and standing at table top surface. Pt needed frequent rest breaks due to level of fatigue      Functional Transfers / Balance:   Date Status DME  Comments   Sit Balance 19  Supervision   Sitting balance up in w/c and on 3:1 commode     Stand Balance 19  CGA  Table top level  Grab bar Standing balance during clothing mgmt, samaria care and sit to stand transfers. [] Tub  [x] Shower   Transfer 19  Min assist Shower chair  Grab bars 19  Pt declined shower today. Commode   Transfer    toileting  19   Minimal Assist       Max  Assist  Grab rail  Min assist on/off 3:1 toilet and max assist for toilet hygiene due to loose bowel issues and thoroughness. Functional   Mobility 19   Minimal Assist  ww  rollator    Other:  Sit<>stand         W/c to bed     19    CGA/MIN A         Min assist   Table, grab bar Sit to stand transfers performed during ADL's/toilet hygiene needs. Functional Exercises / Activity:   Pt engaged in am ADL's to increase independence with dressing and bathing tasks along with compensatory techs to apply toward self. BUE ROM exercises with towel stretches on incline wedge 3 sets 10 reps in all planes  B hand strength with 3 # digi flex 3 sets 10 reps      Sensory / Neuromuscular Re-Education:      Cognitive Skills:   Status Comments   Problem   Solving fair   demo during ADL;s and transfers   Memory fair  \"   Sequencing fair  \"   Safety fair  \"     Visual Perception:    Education:  Pt was educated on compensatory techs during ADL's to increase energy conservation. [] Family teach completed on:    6/17/19: daughter present but did not participate in ADL. Pain Level: 0/10 just fatigues easily. Additional Notes:   6/17/19: requires motivation to participate in ADLs and self challenge   6/20/19 pt needed frequent rest breaks during tx session on this date due to level of fatigue   6/21/19 Nurse notified in regards to patient having loose bowel episode this am during ADL's    Patient has made fair  progress during treatment sessions toward set goals. Therapy emphasis to obtain goals:Current Treatment Recommendations: Strengthening, Gait Training, Safety Education & Training, Balance Training, Patient/Caregiver Education & Training, Self-Care / ADL, Functional Mobility Training, Neuromuscular Re-education, Equipment Evaluation, Education, & procurement, Home Management Training, Endurance Training, Cognitive Reorientation       [x] Continue with current OT Plan of care.   [] Prepare for Discharge    DISCHARGE RECOMMENDATIONS  Recommended DME:    Post Discharge Care:   []Home Independently  []Home with 24hr Care / Supervision [x]Home with Partial Supervision []Home with Home Health OT []Home with Out Pt OT []Other: ___   Comments:         Time in Time out Tx Time Breakdown  Variance:   First Session  8:05am 9:10am [x] Individual Tx-65mins  [] Concurrent Tx -  [] Co-Tx -   [] Group Tx -   [] Time Missed -              Second Session 10:45 11:15 [x] Individual Tx-30    [] Concurrent Tx -   [] Co-Tx -   [] Group Tx -   [x] Time Missed - 15         Pt needed to use the restroom          Third Session    [] Individual Tx-   [] Concurrent Tx -  [] Co-Tx -   [] Group Tx -   [] Time Missed -         Total Tx Time 95 mins     Rafa Kruger GRANT/L [address] OTR/L [id number]

## 2019-06-21 NOTE — PROGRESS NOTES
Speech Language Pathology  ACUTE REHABILITATION -- DAILY PROGRESS NOTE                 SWALLOWING:  Current level: MODIFIED INDEPENDENT    Diet: Regular consistency solids and thin consistency liquids      LANGUAGE:        RECEPTIVE:  Current FIM level: SUPERVISION      Short term FIM goal:        Long term FIM goal: MODIFIED INDEPENDENT         EXPRESSIVE:   Current FIM level: SUPERVISION      Short term FIM goal:       Long term FIM goal:  MODIFIED INDEPENDENT    Pt expressed wants and needs appropriately. Provided context specific response to intermediate/complex level wh- questions. Pt demonstrated a latent response to questions. Actively participated in an ideational language-based categorization activity that asked participants to provide a semantically relevant response given specific pop culture generated stimulus items . COGNITION       PROBLEM SOLVING: Current FIM level: MIN ASSISTANCE/MOD ASSISTANCE      Short term FIM goal: MIN ASSISTANCE      Long term FIM goal: SUPERVISION         MEMORY:    Current FIM level: MIN ASSISTANCE/MOD ASSISTANCE      Short term FIM goal:  MIN ASSISTANCE      Long term FIM goal:  SUPERVISION       SAFETY/INSIGHT:     Demonstrated fair+ STM recall of peers' response and was consistently able to provide novel and/or personalized response to probe items. SPEECH INTELLIGIBILITY:  Good intelligibility for conversational speech.        [address] Pathologist Student Intern  6/21/2019    Three Hour Rule Tracking:    Individual therapy:              0 minutes   Concurrent therapy:  45 minutes  915-10   Group therapy:   0 minutes   Co-treatment therapy:  0 Minutes     Total minutes: 45 minutes

## 2019-06-22 LAB
ORGANISM: ABNORMAL
URINE CULTURE, ROUTINE: ABNORMAL
URINE CULTURE, ROUTINE: ABNORMAL

## 2019-06-22 PROCEDURE — 94640 AIRWAY INHALATION TREATMENT: CPT

## 2019-06-22 PROCEDURE — 2700000000 HC OXYGEN THERAPY PER DAY

## 2019-06-22 PROCEDURE — 6370000000 HC RX 637 (ALT 250 FOR IP): Performed by: PHYSICAL MEDICINE & REHABILITATION

## 2019-06-22 PROCEDURE — 2580000003 HC RX 258: Performed by: PHYSICAL MEDICINE & REHABILITATION

## 2019-06-22 PROCEDURE — 1280000000 HC REHAB R&B

## 2019-06-22 RX ADMIN — DILTIAZEM HYDROCHLORIDE 180 MG: 180 CAPSULE, EXTENDED RELEASE ORAL at 08:38

## 2019-06-22 RX ADMIN — Medication 10 ML: at 21:09

## 2019-06-22 RX ADMIN — DIGOXIN 125 MCG: 125 TABLET ORAL at 08:37

## 2019-06-22 RX ADMIN — OXYCODONE HYDROCHLORIDE 5 MG: 5 TABLET ORAL at 21:04

## 2019-06-22 RX ADMIN — Medication 10 ML: at 08:38

## 2019-06-22 RX ADMIN — BUMETANIDE 2 MG: 1 TABLET ORAL at 18:41

## 2019-06-22 RX ADMIN — ALLOPURINOL 200 MG: 100 TABLET ORAL at 08:38

## 2019-06-22 RX ADMIN — OXYCODONE HYDROCHLORIDE 5 MG: 5 TABLET ORAL at 08:38

## 2019-06-22 RX ADMIN — IPRATROPIUM BROMIDE AND ALBUTEROL SULFATE 1 AMPULE: .5; 3 SOLUTION RESPIRATORY (INHALATION) at 09:15

## 2019-06-22 RX ADMIN — FERROUS SULFATE TAB 325 MG (65 MG ELEMENTAL FE) 325 MG: 325 (65 FE) TAB at 08:38

## 2019-06-22 RX ADMIN — Medication 5 DROP: at 08:38

## 2019-06-22 RX ADMIN — BUMETANIDE 2 MG: 1 TABLET ORAL at 08:37

## 2019-06-22 ASSESSMENT — PAIN DESCRIPTION - PAIN TYPE
TYPE: CHRONIC PAIN
TYPE: CHRONIC PAIN

## 2019-06-22 ASSESSMENT — PAIN DESCRIPTION - ORIENTATION: ORIENTATION: MID;LOWER

## 2019-06-22 ASSESSMENT — PAIN DESCRIPTION - ONSET
ONSET: ON-GOING
ONSET: ON-GOING

## 2019-06-22 ASSESSMENT — PAIN - FUNCTIONAL ASSESSMENT: PAIN_FUNCTIONAL_ASSESSMENT: PREVENTS OR INTERFERES SOME ACTIVE ACTIVITIES AND ADLS

## 2019-06-22 ASSESSMENT — PAIN DESCRIPTION - LOCATION
LOCATION: BACK
LOCATION: BACK

## 2019-06-22 ASSESSMENT — PAIN SCALES - GENERAL
PAINLEVEL_OUTOF10: 8
PAINLEVEL_OUTOF10: 8
PAINLEVEL_OUTOF10: 0
PAINLEVEL_OUTOF10: 0
PAINLEVEL_OUTOF10: 3

## 2019-06-22 ASSESSMENT — PAIN DESCRIPTION - FREQUENCY
FREQUENCY: CONTINUOUS
FREQUENCY: CONTINUOUS

## 2019-06-22 ASSESSMENT — PAIN DESCRIPTION - PROGRESSION
CLINICAL_PROGRESSION: NOT CHANGED
CLINICAL_PROGRESSION: NOT CHANGED

## 2019-06-22 ASSESSMENT — PAIN DESCRIPTION - DESCRIPTORS
DESCRIPTORS: ACHING;CONSTANT;DISCOMFORT
DESCRIPTORS: CONSTANT;DULL;DISCOMFORT

## 2019-06-22 NOTE — PROGRESS NOTES
Pt. Having one unit of blood transfused. Pt. Was pre-medicated before infusion. Pt. Is tolerating transfusion well.   Jay Hagen

## 2019-06-22 NOTE — PROGRESS NOTES
Gonzalo Swan is a 68 y.o. female patient.     Current Facility-Administered Medications   Medication Dose Route Frequency Provider Last Rate Last Dose    0.9 % sodium chloride bolus  250 mL Intravenous Once Dannie Urena MD        ipratropium-albuterol (DUONEB) nebulizer solution 1 ampule  1 ampule Inhalation Q4H WA Dannie Urena MD   1 ampule at 06/22/19 0915    bumetanide (BUMEX) tablet 2 mg  2 mg Oral BID Dannie Urena MD   2 mg at 06/22/19 0837    carbamide peroxide (DEBROX) 6.5 % otic solution 5 drop  5 drop Both Ears BID Dannie Urena MD   5 drop at 06/22/19 9887    sodium chloride flush 0.9 % injection 10 mL  10 mL Intravenous BID Bhavik Vázquez MD   10 mL at 06/22/19 6972    sodium chloride flush 0.9 % injection 10 mL  10 mL Intravenous PRN Dannie Urena MD        diltiazem (CARDIZEM CD) extended release capsule 180 mg  180 mg Oral Daily Dannie Urena MD   180 mg at 06/22/19 5327    acetaminophen (TYLENOL) tablet 650 mg  650 mg Oral Q4H PRN Dannie Urena MD   650 mg at 06/19/19 0144    magnesium hydroxide (MILK OF MAGNESIA) 400 MG/5ML suspension 30 mL  30 mL Oral Daily PRN Dannie Urena MD        acetaminophen (TYLENOL) tablet 500 mg  500 mg Oral 4 times per day Dannie Urena MD   500 mg at 06/16/19 0100    vitamin C (ASCORBIC ACID) tablet 500 mg  500 mg Oral Daily Bhavik Vázquez MD        digoxin (LANOXIN) tablet 125 mcg  125 mcg Oral Daily Dannie Urena MD   125 mcg at 06/22/19 3438    ferrous sulfate tablet 325 mg  325 mg Oral Daily with breakfast Dannie Urena MD   325 mg at 06/22/19 0838    magnesium oxide (MAG-OX) tablet 400 mg  400 mg Oral Daily Bhavik Vázquez MD   400 mg at 06/14/19 0920    oxyCODONE (ROXICODONE) immediate release tablet 5 mg  5 mg Oral BID Dannie Urena MD   5 mg at 06/22/19 0838    pantoprazole (PROTONIX) tablet 40 mg  40 mg Oral QAM AC Bhavik Vázquez MD        sodium bicarbonate tablet 650 mg  650 mg Oral BID Media Low MARC Vázquez MD   650 mg at 06/14/19 0920    sodium chloride (OCEAN, BABY AYR) 0.65 % nasal spray 1 spray  1 spray Each Nostril 4x Daily PRN Shara Friedman MD        ruxolitinib phosphate (JAKAFI) 10 MG tablet TABS 10 mg  10 mg Oral BID Shara Friedman MD   10 mg at 06/22/19 5869     Allergies   Allergen Reactions    Metoprolol Tartrate [Metoprolol]      Active Problems:    Traumatic brain injury of unknown intent ([facility] [address]    Moderate protein-calorie malnutrition ([facility] [address]  Resolved Problems:    * No resolved hospital problems. *    Blood pressure (!) 129/57, pulse 83, temperature 98.2 °F (36.8 °C), temperature source Oral, resp. rate 18, height 5' 3\" (1.6 m), weight 151 lb 11.2 oz (68.8 kg), SpO2 96 %. Subjective:  Symptoms:  Stable. She reports weakness. Diet:  Adequate intake. Activity level: Impaired due to weakness. Pain:  She reports no pain. Objective:  General Appearance: In no acute distress. Vital signs: (most recent): Blood pressure (!) 129/57, pulse 83, temperature 98.2 °F (36.8 °C), temperature source Oral, resp. rate 18, height 5' 3\" (1.6 m), weight 151 lb 11.2 oz (68.8 kg), SpO2 96 %. Vital signs are normal.    Output: Producing urine and producing stool. Lungs:  Normal effort and normal respiratory rate. Breath sounds clear to auscultation. Heart: Normal rate. Regular rhythm. S1 normal and S2 normal.    Abdomen: Abdomen is soft. Bowel sounds are normal.   There is no abdominal tenderness. Extremities: Normal range of motion. Neurological: Patient is alert. (4/5 str). Assessment:    Condition: In stable condition. Improving.   (TBI). Plan:   Encourage ambulation. (Had transfusion yesterday  We will recheck labs on Monday  She wants Zyloprim and vitamin D stop  Complains of not sleeping but transfusion was disrupting her sleep last night).        Shara Friedman MD  6/22/2019

## 2019-06-22 NOTE — PROGRESS NOTES
Physical Therapy  Facility/Department: [address] REHAB  Daily Treatment Note  NAME: Elyssa Salter  : 1945  MRN: [id number]    Date of Service: 2019  Evaluating Therapist: Victor M Blanco DPT     ROOM: 9368/6753-J  DIAGNOSIS: TBI  PMH: Pt found down in residence on 19 with facial wound. CT of head demonstrated L SDH with shift (from assumed fall). L frontal facial laceration s/p repair noted during hospitalization. Intubated upon arrival to hospital. PMH includes myelofibrosis, CHF, AFIB, GERG, pacemaker, CHF.      PRECAUTIONS: fluid restriction, falls, dysphagia      Social:  Pt lives alone in a single floor Sr. high rise/independent living facility (3rd floor apartment with elevator access). PTA, pt used a rollator for mobility, w/c, ww, lift chair. [facility] RN 2x a week from [facility] located in [address]       Initial Evaluation  19 AM     Short Term Goals Long Term Goals    Was pt agreeable to Eval/treatment? yes No pt refused. \" Oh my God ,they just put me back to bed , I don't feel good. \"         Does pt have pain?  C/o mild HA/ R arm pain         Bed Mobility  Rolling: sba  Supine to sit: cg/sba  Sit to supine: Felipe  Scooting: sba       sup Mod I   Transfers Sit to stand: cgA  Stand to sit: cgA  Stand pivot: cgA with ww   sba with AAD  sup with AAD   Ambulation    30 feet with ww with cg/sba   150 feet with AAD with sup  150 feet with AAD with  sup    Walking 10 feet on uneven surface NT         Wheel Chair Mobility NT         Car Transfers NT   sup  sup    Stair negotiation: ascended and descended  1 steps with bilateral rail with Felipe   4 steps with bilateral rail with sba 4 steps with bilateral rail with  Felipe   Curb Step:   ascended and descended 4 inch step with ww and Felipe   4 inch step with AAD and sba 4 inch step with AAD and  Felipe   Picking up object off the floor NT           BLE ROM WFl           BLE Strength Bilateral Hips 3+/5, knees/ankles 4/5           Balance  Sitting: sup  Standing: cgA with ww           Date Family Teach Completed TBA (dtr present on eval)           Is additional Family Teaching Needed?   Y or N Y           Hindering Progress Balance/neuropathy, endurance/activity tolerance           PT recommended ELOS 7-10 days           Team's Discharge Plan             Therapist at Team Meeting               Time : Naheed 153 PTA 0442

## 2019-06-22 NOTE — PROGRESS NOTES
Speech Language Pathology      NAME:  Jett Williamson  :  1945  DATE: 2019  ROOM:  [id number]    [facility] ST this date. Pt with family, laying in bed with daughter reporting diarrhea; nurse made aware by family.     Traumatic brain injury of unknown intent [facility]) [E14.6D2Z]    Sd Banda M.S., CCC-SLP  Speech-Language Pathologist  [id number]  2019

## 2019-06-23 PROCEDURE — 6370000000 HC RX 637 (ALT 250 FOR IP): Performed by: PHYSICAL MEDICINE & REHABILITATION

## 2019-06-23 PROCEDURE — 2580000003 HC RX 258: Performed by: PHYSICAL MEDICINE & REHABILITATION

## 2019-06-23 PROCEDURE — 2700000000 HC OXYGEN THERAPY PER DAY

## 2019-06-23 PROCEDURE — 97535 SELF CARE MNGMENT TRAINING: CPT

## 2019-06-23 PROCEDURE — 1280000000 HC REHAB R&B

## 2019-06-23 RX ORDER — FLUCONAZOLE 100 MG/1
200 TABLET ORAL DAILY
Status: DISCONTINUED | OUTPATIENT
Start: 2019-06-23 | End: 2019-06-25 | Stop reason: HOSPADM

## 2019-06-23 RX ORDER — LOPERAMIDE HYDROCHLORIDE 2 MG/1
2 CAPSULE ORAL 3 TIMES DAILY PRN
Status: DISCONTINUED | OUTPATIENT
Start: 2019-06-23 | End: 2019-06-25 | Stop reason: HOSPADM

## 2019-06-23 RX ADMIN — BUMETANIDE 2 MG: 1 TABLET ORAL at 08:16

## 2019-06-23 RX ADMIN — DIGOXIN 125 MCG: 125 TABLET ORAL at 08:16

## 2019-06-23 RX ADMIN — BUMETANIDE 2 MG: 1 TABLET ORAL at 17:23

## 2019-06-23 RX ADMIN — Medication 10 ML: at 08:16

## 2019-06-23 RX ADMIN — FERROUS SULFATE TAB 325 MG (65 MG ELEMENTAL FE) 325 MG: 325 (65 FE) TAB at 08:16

## 2019-06-23 RX ADMIN — SODIUM BICARBONATE 650 MG: 650 TABLET ORAL at 21:51

## 2019-06-23 RX ADMIN — OXYCODONE HYDROCHLORIDE 5 MG: 5 TABLET ORAL at 08:15

## 2019-06-23 RX ADMIN — OXYCODONE HYDROCHLORIDE 5 MG: 5 TABLET ORAL at 21:51

## 2019-06-23 RX ADMIN — DILTIAZEM HYDROCHLORIDE 180 MG: 180 CAPSULE, EXTENDED RELEASE ORAL at 08:16

## 2019-06-23 ASSESSMENT — PAIN DESCRIPTION - PAIN TYPE
TYPE: CHRONIC PAIN
TYPE: CHRONIC PAIN

## 2019-06-23 ASSESSMENT — PAIN DESCRIPTION - DESCRIPTORS
DESCRIPTORS: CONSTANT;DISCOMFORT;DULL
DESCRIPTORS: RADIATING;DISCOMFORT

## 2019-06-23 ASSESSMENT — PAIN DESCRIPTION - PROGRESSION
CLINICAL_PROGRESSION: NOT CHANGED
CLINICAL_PROGRESSION: NOT CHANGED

## 2019-06-23 ASSESSMENT — PAIN DESCRIPTION - ONSET
ONSET: ON-GOING
ONSET: ON-GOING

## 2019-06-23 ASSESSMENT — PAIN DESCRIPTION - LOCATION
LOCATION: BACK
LOCATION: BACK

## 2019-06-23 ASSESSMENT — PAIN SCALES - GENERAL
PAINLEVEL_OUTOF10: 0
PAINLEVEL_OUTOF10: 0
PAINLEVEL_OUTOF10: 7
PAINLEVEL_OUTOF10: 0
PAINLEVEL_OUTOF10: 3
PAINLEVEL_OUTOF10: 9

## 2019-06-23 ASSESSMENT — PAIN DESCRIPTION - ORIENTATION: ORIENTATION: MID

## 2019-06-23 ASSESSMENT — PAIN DESCRIPTION - FREQUENCY
FREQUENCY: CONTINUOUS
FREQUENCY: CONTINUOUS

## 2019-06-23 ASSESSMENT — PAIN - FUNCTIONAL ASSESSMENT: PAIN_FUNCTIONAL_ASSESSMENT: PREVENTS OR INTERFERES SOME ACTIVE ACTIVITIES AND ADLS

## 2019-06-23 NOTE — PROGRESS NOTES
Arlene Cooper is a 68 y.o. female patient.     Current Facility-Administered Medications   Medication Dose Route Frequency Provider Last Rate Last Dose    fluconazole (DIFLUCAN) tablet 200 mg  200 mg Oral Daily Bhavik Vzáquez MD        loperamide (IMODIUM) capsule 2 mg  2 mg Oral TID PRN Bhavik Vázquez MD        0.9 % sodium chloride bolus  250 mL Intravenous Once David Solomon MD        ipratropium-albuterol (DUONEB) nebulizer solution 1 ampule  1 ampule Inhalation Q4H WA David Solomon MD   1 ampule at 06/22/19 0915    bumetanide (BUMEX) tablet 2 mg  2 mg Oral BID David Solomon MD   2 mg at 06/23/19 0816    carbamide peroxide (DEBROX) 6.5 % otic solution 5 drop  5 drop Both Ears BID David Solomon MD   5 drop at 06/22/19 4349    sodium chloride flush 0.9 % injection 10 mL  10 mL Intravenous BID Bhavik Vázquez MD   10 mL at 06/23/19 0816    sodium chloride flush 0.9 % injection 10 mL  10 mL Intravenous PRN David Solomon MD        diltiazem (CARDIZEM CD) extended release capsule 180 mg  180 mg Oral Daily David Solomon MD   180 mg at 06/23/19 0816    acetaminophen (TYLENOL) tablet 650 mg  650 mg Oral Q4H PRN David Solomon MD   650 mg at 06/19/19 0144    magnesium hydroxide (MILK OF MAGNESIA) 400 MG/5ML suspension 30 mL  30 mL Oral Daily PRN David Solomon MD        acetaminophen (TYLENOL) tablet 500 mg  500 mg Oral 4 times per day David Solomon MD   500 mg at 06/16/19 0100    vitamin C (ASCORBIC ACID) tablet 500 mg  500 mg Oral Daily Bhavik Vázquez MD        digoxin (LANOXIN) tablet 125 mcg  125 mcg Oral Daily David Solomon MD   125 mcg at 06/23/19 0816    ferrous sulfate tablet 325 mg  325 mg Oral Daily with breakfast David Solomon MD   325 mg at 06/23/19 0816    magnesium oxide (MAG-OX) tablet 400 mg  400 mg Oral Daily Bhavik A MD Kathie   400 mg at 06/14/19 0920    oxyCODONE (ROXICODONE) immediate release tablet 5 mg  5 mg Oral BID David Solomon MD   5 mg at 06/23/19 0815    pantoprazole (PROTONIX) tablet 40 mg  40 mg Oral QAM AC Bhavik Vázquez MD        sodium bicarbonate tablet 650 mg  650 mg Oral BID Reji Coley MD   650 mg at 06/14/19 0920    sodium chloride (OCEAN, BABY AYR) 0.65 % nasal spray 1 spray  1 spray Each Nostril 4x Daily PRN Reji Coley MD        ruxolitinib phosphate (JAKAFI) 10 MG tablet TABS 10 mg  10 mg Oral BID Reji Coley MD   10 mg at 06/23/19 0815     Allergies   Allergen Reactions    Metoprolol Tartrate [Metoprolol]      Active Problems:    Traumatic brain injury of unknown intent ([facility] [address]    Moderate protein-calorie malnutrition ([facility]  Resolved Problems:    * No resolved hospital problems. *    Blood pressure 122/60, pulse 89, temperature 98.7 °F (37.1 °C), temperature source Temporal, resp. rate 16, height 5' 3\" (1.6 m), weight 151 lb 11.2 oz (68.8 kg), SpO2 98 %. Subjective:  Symptoms:  Stable. She reports weakness. Diet:  Adequate intake. Activity level: Impaired due to weakness. Pain:  She reports no pain. Objective:  General Appearance: In no acute distress. Vital signs: (most recent): Blood pressure 122/60, pulse 89, temperature 98.7 °F (37.1 °C), temperature source Temporal, resp. rate 16, height 5' 3\" (1.6 m), weight 151 lb 11.2 oz (68.8 kg), SpO2 98 %. Vital signs are normal.    Output: Producing urine and producing stool. Lungs:  Normal effort and normal respiratory rate. Breath sounds clear to auscultation. Heart: Normal rate. Regular rhythm. S1 normal and S2 normal.    Abdomen: Abdomen is soft. Bowel sounds are normal.   There is no abdominal tenderness. Extremities: Normal range of motion. Neurological: Patient is alert. (4/5 str). Assessment:    Condition: In stable condition. Improving. (Traumatic brain injury). Plan:   Encourage ambulation. (Occasional loose stools  Family requesting Imodium  Candida in bladder  Will give Diflucan for 3 days  ). Christ Krueger MD  6/23/2019

## 2019-06-23 NOTE — PROGRESS NOTES
Occupational Therapy  OCCUPATIONAL THERAPY DAILY NOTE    Date:2019  Patient Name: Yanique Key  MRN: [id number]  : 1945  Room: [address] [place] [address]     Diagnosis: S/P Fall with SDH, Lacerations to Head and R Elbow     Precautions: falls, O2 3 liters     Functional Assessment:   Date Status AE  Comments   Feeding 19  Minimal Assist      Grooming 19 CGA/Min A     Bathing 19  UB CGA/min assist  LB Moderate Assist        UB Dressing 19  Set-up  Set up to doff sweater while seated EOB. LB Dressing 19  Min A   To don pants while seated EOB and standing to pull over hips. Homemaking 19  CGA        Functional Transfers / Balance:   Date Status DME  Comments   Sit Balance 19  Supervision   EOB     Stand Balance 19  SBA  w/w     [] Tub  [x] Shower   Transfer 19  Min assist Shower chair  Grab bars    Commode   Transfer    toileting  19   SBA       Max  Assist  Grab rail, w/w        Pt Min A for clothing management standing and Max A for hygiene following BM. Functional   Mobility 19   SBA    rollator Short home distances   Other:  Sit<>stand         W/c to bed     19  SBA        CGA         HHA      Functional Exercises / Activity:    Sensory / Neuromuscular Re-Education:      Cognitive Skills:   Status Comments   Problem   Solving fair   demo during ADL;s and transfers   Memory fair  \"   Sequencing fair  \"   Safety fair  \"     Visual Perception:    Education:  Pt was educated on compensatory techs during LB dressing to increase independence. [] Family teach completed on:    19: daughter present but did not participate in ADL. Pain Level: 0/10 just fatigues easily.      Additional Notes:   19: requires motivation to participate in ADLs and self challenge   19 pt needed frequent rest breaks during tx session on this date due to level of fatigue   19 Nurse notified in regards to patient having loose bowel episode this am during ADL's    Patient has made fair  progress during treatment sessions toward set goals. Therapy emphasis to obtain goals:Current Treatment Recommendations: Strengthening, Gait Training, Safety Education & Training, Balance Training, Patient/Caregiver Education & Training, Self-Care / ADL, Functional Mobility Training, Neuromuscular Re-education, Equipment Evaluation, Education, & procurement, Home Management Training, Endurance Training, Cognitive Reorientation       [x] Continue with current OT Plan of care. [] Prepare for Discharge    DISCHARGE RECOMMENDATIONS  Recommended DME:    Post Discharge Care:   []Home Independently  []Home with 24hr Care / Supervision [x]Home with Partial Supervision []Home with Home Health OT []Home with Out Pt OT []Other: ___   Comments:         Time in Time out Tx Time Breakdown  Variance:   First Session  [id number] [x] Individual Tx- 30 mins  [] Concurrent Tx -  [] Co-Tx -   [] Group Tx -   [] Time Missed -              Second Session   [] Individual Tx-   [] Concurrent Tx -   [] Co-Tx -   [] Group Tx -   [] Time Missed -                    Third Session    [] Individual Tx-   [] Concurrent Tx -  [] Co-Tx -   [] Group Tx -   [] Time Missed -         Total Tx Time 30 mins     Dionna Hassan, GRANT/L [id number]  I have read the above note and agree with the documentation.   Azam Frazier OTR/L [id number]

## 2019-06-24 LAB
ANION GAP SERPL CALCULATED.3IONS-SCNC: 14 MMOL/L (ref 7–16)
BUN BLDV-MCNC: 44 MG/DL (ref 8–23)
CALCIUM SERPL-MCNC: 8.2 MG/DL (ref 8.6–10.2)
CHLORIDE BLD-SCNC: 94 MMOL/L (ref 98–107)
CO2: 23 MMOL/L (ref 22–29)
CREAT SERPL-MCNC: 1.1 MG/DL (ref 0.5–1)
GFR AFRICAN AMERICAN: 59
GFR NON-AFRICAN AMERICAN: 49 ML/MIN/1.73
GLUCOSE BLD-MCNC: 94 MG/DL (ref 74–99)
HCT VFR BLD CALC: 23.6 % (ref 34–48)
HEMOGLOBIN: 7.6 G/DL (ref 11.5–15.5)
MCH RBC QN AUTO: 28.7 PG (ref 26–35)
MCHC RBC AUTO-ENTMCNC: 32.2 % (ref 32–34.5)
MCV RBC AUTO: 89.1 FL (ref 80–99.9)
PDW BLD-RTO: 16.2 FL (ref 11.5–15)
PLATELET # BLD: 659 E9/L (ref 130–450)
PMV BLD AUTO: 11.9 FL (ref 7–12)
POTASSIUM SERPL-SCNC: 4.5 MMOL/L (ref 3.5–5)
RBC # BLD: 2.65 E12/L (ref 3.5–5.5)
SODIUM BLD-SCNC: 131 MMOL/L (ref 132–146)
WBC # BLD: 17.2 E9/L (ref 4.5–11.5)

## 2019-06-24 PROCEDURE — 97530 THERAPEUTIC ACTIVITIES: CPT

## 2019-06-24 PROCEDURE — 6370000000 HC RX 637 (ALT 250 FOR IP): Performed by: PHYSICAL MEDICINE & REHABILITATION

## 2019-06-24 PROCEDURE — 85027 COMPLETE CBC AUTOMATED: CPT

## 2019-06-24 PROCEDURE — 80048 BASIC METABOLIC PNL TOTAL CA: CPT

## 2019-06-24 PROCEDURE — 2700000000 HC OXYGEN THERAPY PER DAY

## 2019-06-24 PROCEDURE — 97110 THERAPEUTIC EXERCISES: CPT

## 2019-06-24 PROCEDURE — 2580000003 HC RX 258: Performed by: PHYSICAL MEDICINE & REHABILITATION

## 2019-06-24 PROCEDURE — 97535 SELF CARE MNGMENT TRAINING: CPT

## 2019-06-24 PROCEDURE — 36415 COLL VENOUS BLD VENIPUNCTURE: CPT

## 2019-06-24 PROCEDURE — 1280000000 HC REHAB R&B

## 2019-06-24 RX ADMIN — DIGOXIN 125 MCG: 125 TABLET ORAL at 08:42

## 2019-06-24 RX ADMIN — ACETAMINOPHEN 500 MG: 500 TABLET ORAL at 07:14

## 2019-06-24 RX ADMIN — Medication 10 ML: at 08:40

## 2019-06-24 RX ADMIN — OXYCODONE HYDROCHLORIDE 5 MG: 5 TABLET ORAL at 08:42

## 2019-06-24 RX ADMIN — FERROUS SULFATE TAB 325 MG (65 MG ELEMENTAL FE) 325 MG: 325 (65 FE) TAB at 08:42

## 2019-06-24 RX ADMIN — DILTIAZEM HYDROCHLORIDE 180 MG: 180 CAPSULE, EXTENDED RELEASE ORAL at 08:41

## 2019-06-24 RX ADMIN — OXYCODONE HYDROCHLORIDE 5 MG: 5 TABLET ORAL at 20:44

## 2019-06-24 RX ADMIN — PANTOPRAZOLE SODIUM 40 MG: 40 TABLET, DELAYED RELEASE ORAL at 07:14

## 2019-06-24 RX ADMIN — BUMETANIDE 2 MG: 1 TABLET ORAL at 08:42

## 2019-06-24 RX ADMIN — FLUCONAZOLE 200 MG: 100 TABLET ORAL at 08:42

## 2019-06-24 RX ADMIN — BUMETANIDE 2 MG: 1 TABLET ORAL at 17:41

## 2019-06-24 ASSESSMENT — PAIN SCALES - GENERAL
PAINLEVEL_OUTOF10: 0
PAINLEVEL_OUTOF10: 3
PAINLEVEL_OUTOF10: 5
PAINLEVEL_OUTOF10: 0
PAINLEVEL_OUTOF10: 7
PAINLEVEL_OUTOF10: 7

## 2019-06-24 ASSESSMENT — PAIN DESCRIPTION - DESCRIPTORS
DESCRIPTORS: ACHING;CONSTANT;DISCOMFORT
DESCRIPTORS: ACHING;CONSTANT;DULL;DISCOMFORT

## 2019-06-24 ASSESSMENT — PAIN DESCRIPTION - FREQUENCY
FREQUENCY: CONTINUOUS
FREQUENCY: CONTINUOUS

## 2019-06-24 ASSESSMENT — PAIN - FUNCTIONAL ASSESSMENT: PAIN_FUNCTIONAL_ASSESSMENT: PREVENTS OR INTERFERES SOME ACTIVE ACTIVITIES AND ADLS

## 2019-06-24 ASSESSMENT — PAIN DESCRIPTION - PAIN TYPE
TYPE: CHRONIC PAIN
TYPE: CHRONIC PAIN

## 2019-06-24 ASSESSMENT — PAIN DESCRIPTION - LOCATION
LOCATION: BACK
LOCATION: BACK

## 2019-06-24 ASSESSMENT — PAIN DESCRIPTION - PROGRESSION: CLINICAL_PROGRESSION: NOT CHANGED

## 2019-06-24 ASSESSMENT — PAIN DESCRIPTION - ONSET: ONSET: ON-GOING

## 2019-06-24 ASSESSMENT — PAIN DESCRIPTION - ORIENTATION: ORIENTATION: MID

## 2019-06-24 NOTE — PROGRESS NOTES
bumetanide (BUMEX) 1 MG tablet Take 2 mg by mouth 2 times daily   Yes Historical Provider, MD   Cholecalciferol (VITAMIN D3) 2000 units CAPS Take 2,000 Units by mouth daily   Yes Historical Provider, MD   digoxin (LANOXIN) 125 MCG tablet Take 125 mcg by mouth daily   Yes Historical Provider, MD   diltiazem (CARDIZEM CD) 120 MG extended release capsule Take 120 mg by mouth daily   Yes Historical Provider, MD   ferrous sulfate 325 (65 Fe) MG tablet Take 325 mg by mouth daily (with breakfast)   Yes Historical Provider, MD   ruxolitinib phosphate (JAKAFI) 10 MG tablet Take by mouth 2 times daily   Yes Historical Provider, MD   magnesium oxide (MAG-OX) 400 MG tablet Take 400 mg by mouth daily   Yes Historical Provider, MD   metolazone (ZAROXOLYN) 5 MG tablet Take 5 mg by mouth 2 times daily   Yes Historical Provider, MD   oxyCODONE 5 MG capsule Take 5 mg by mouth 2 times daily. Yes Historical Provider, MD   pantoprazole (PROTONIX) 40 MG tablet Take 40 mg by mouth daily   Yes Historical Provider, MD   phenol 1.4 % LIQD mouth spray Take 2 sprays by mouth every hour as needed for Sore Throat   Yes Historical Provider, MD   sodium bicarbonate 650 MG tablet Take 650 mg by mouth 2 times daily   Yes Historical Provider, MD   sodium chloride (OCEAN, BABY AYR) 0.65 % nasal spray 1 spray by Nasal route 4 times daily as needed for Congestion   Yes Historical Provider, MD       Allergies  Allergies   Allergen Reactions    Metoprolol Tartrate [Metoprolol]        Review of Systems:    Constitutional:  No fever chills or rigors. + fatigue  Eyes: No changes in vision, discharge, or pain  ENT: No Headaches, hearing loss or vertigo. No mouth sores or sore throat. No change in taste or smell. Cardiovascular: No chest discomfort, dyspnea on exertion, palpitations or loss of consciousness. or phlebitis. Respiratory: Has no cough or wheezing, Has no sputum production. Has no hemoptysis, Has no pleuritic pain, .    Gastrointestinal: No abdominal pain, appetite loss, blood in stools. No change in bowel habits. No hematemesis   Genitourinary: Patient acknowledges no dysuria, trouble voiding, or hematuria. No nocturia or increased frequency. Musculoskeletal: Head pain at site of hematoma  Integumentary: No rash or pruritis. Neurological: No headache, diplopia, change in muscle strength, numbness or tingling. No change in gait, balance, coordination, mood, affect, memory, mentation, behavior. Psychiatric: No anxiety, or depression. Endocrine: No temperature intolerance. No excessive thirst, fluid intake, or urination. No tremor. Hematologic/Lymphatic: No abnormal bruising or bleeding, blood clots or swollen lymph nodes. Allergic/Immunologic: No nasal congestion or hives. Objective  BP (!) 123/59   Pulse 86   Temp 98.6 °F (37 °C) (Temporal)   Resp 16   Ht 5' 3\" (1.6 m)   Wt 151 lb 11.2 oz (68.8 kg)   SpO2 98%   BMI 26.87 kg/m²     Physical Exam:   Performance Status:  General: AAO to person, place, time, in no acute distress, pleasant   Head and neck : PERRLA, EOMI . Sclera non icteric. Oropharynx : Clear  Neck: no JVD,  no adenopathy  LYMPHATICS : No LAD  Heart: Regular rate and regular rhythm, no murmur  Lungs: Clear to auscultation   Extremities: No edema,no cyanosis, no clubbing. Abdomen: Soft, non-tender;no masses. + splenomegaly  Skin:  No rash. Large hematoma on R forehead  Neurologic:Cranial nerves grossly intact. No focal motor or sensory deficits .     Recent Laboratory Data-   Lab Results   Component Value Date    WBC 17.2 (H) 06/24/2019    HGB 7.6 (L) 06/24/2019    HCT 23.6 (L) 06/24/2019    MCV 89.1 06/24/2019     (H) 06/24/2019    LYMPHOPCT 13.0 (L) 06/14/2019    RBC 2.65 (L) 06/24/2019    MCH 28.7 06/24/2019    MCHC 32.2 06/24/2019    RDW 16.2 (H) 06/24/2019    NEUTOPHILPCT 69.0 06/14/2019    MONOPCT 2.0 06/14/2019    BASOPCT 0.0 06/14/2019    NEUTROABS 18.73 (H) 06/14/2019    LYMPHSABS 2.90 06/14/2019 MONOSABS 0.45 06/14/2019    EOSABS 0.00 (L) 06/14/2019    BASOSABS 0.00 06/14/2019       Lab Results   Component Value Date     (L) 06/24/2019    K 4.5 06/24/2019    CL 94 (L) 06/24/2019    CO2 23 06/24/2019    BUN 44 (H) 06/24/2019    CREATININE 1.1 (H) 06/24/2019    GLUCOSE 94 06/24/2019    CALCIUM 8.2 (L) 06/24/2019    LABGLOM 49 06/24/2019    GFRAA 59 06/24/2019       No results found for: IRON, TIBC, FERRITIN        Radiology-    XR CHEST STANDARD (2 VW)   Final Result   CHF without edema. ASSESSMENT/PLAN :  67 yo female  Admitted for further rehab s/p fall and subdural hematoma not requiring surgical intervention  Myelofibrosis    - CBC reviewed, consistent with MF  - Per pt, her platelet and WBC counts are normally well controlled.  Current elevations likely reactive in nature from trauma and may continue to down trend  - Continue Jakafi 10 mg PO BID  - Would not add hydrea at this time as her counts may continue to improve on their own  - Transfuse for Hgb <7  - Hgb improved s/p 1 unit 6/23/19 , 7.6  - Thrombocytosis continues to down trend  Going to rehab 6/25 in [address] [place] [address] , discussed with nurse , pt to arrange CBC with diff at rehab at least twice weekly , report to  , her hematologist & arrange follow up with him   We will not be following her       Electronically signed by Kristen Angulo MD on 6/24/2019 at 2:52 PM

## 2019-06-24 NOTE — PROGRESS NOTES
Physical Therapy    Facility/Department: [facility] RE[facility]  Weekly Note     NAME: Alisha Norman  : 1945  MRN: [id number]    Date of Service: 2019    Evaluating Therapist: [place] DPT    ROOM: [address] [place], [place] [address]-E  DIAGNOSIS: TBI  PMH: Pt found down in residence on 19 with facial wound. CT of head demonstrated L SDH with shift (from assumed fall). L frontal facial laceration s/p repair noted during hospitalization. Intubated upon arrival to hospital. PMH includes myelofibrosis, CHF, AFIB, GERG, pacemaker, CHF. PRECAUTIONS: fluid restriction, falls, dysphagia     Social:  Pt lives alone in a single floor Sr. high rise/independent living facility (3rd floor apartment with elevator access). PTA, pt used a rollator for mobility, w/c, ww, lift chair. Daniel Roe RN 2x a week from [facility] located in [place], [place]     Initial Evaluation  19  comments  Short Term Goals Long Term Goals    Was pt agreeable to Eval/treatment? yes Yes        Does pt have pain?  C/o mild HA/ R arm pain No c/o pain       Bed Mobility  Rolling: sba  Supine to sit: cg/sba  Sit to supine: Felipe  Scooting: sba Rolling: SBA  Supine to sit: min A   Sit to supine: mod A   Scooting: SBA Rolling: SBA  Supine to sit: min A for BLE  Sit to supine: min A  Scooting: SBA Assist with LEs  sup Mod I   Transfers Sit to stand: cgA  Stand to sit: cgA  Stand pivot: cgA with ww Sit to stand min A  Stand to sit SBA  Stand pivot with ww with SBA Sit to stand: SBA  Stand to sit: SBA  Stand pivot: SBA with ww Min cues for safety  sba with AAD   sup with AAD          Ambulation    30 feet with ww with cg/sba 125 feet with ww with SBA 75 feet with ww with SBA Significant time required for rest post 150 feet with AAD with sup  150 feet with AAD with  sup   Wheel Chair Mobility NT NT NT      Car Transfers NT Min A Min A Requires assist for BLE sup  sup    Stair negotiation: ascended and descended  1 steps with bilateral rail with Felipe 3 steps B HR min A  4 steps with 2 rails with CGA VC for sequencing  4 steps with bilateral rail with sba 4 steps with bilateral rail with  Felipe   BLE ROM [facility] W[facility]      BLE Strength Bilateral Hips 3+/5, knees/ankles 4/5 NT Bilateral Hips 3+/5 knees/ankles 4/5      Balance  Sitting: sup  Standing: cgA with ww SBA<>min A Sitting: sup  Standing: SBA with ww      Date Family Teach Completed TBA (dtr present on eval) dtr present 6/17 dtr present 6/17      Is additional Family Teaching Needed?   Y or N Y  N      Hindering Progress Balance/neuropathy, endurance/activity tolerance Balance, neuropathy, endurance Self limiting      PT recommended ELOS 7-10 days  D/C 6/25 to SNF      Team's Discharge Plan  7-10 days       Therapist at Team Meeting  Claire Callejas, PT, DPT [id number]           Date:  6/24/19  Supporting factors: Pt making limited progress within POC  Barriers to discharge: None d/c to SNF   Additional comments: Pt self limiting with all functional mobility  DME: d/c to SNF  After Care:  d/c to SNF    Magdiel Crouch PTA [id number]    Date: 6/17/19  Supporting factors: can be motivated at times   Barriers to discharge: weakness/fatigue, low H and H  Additional comments: Pt progressing well but slowly within POC, anticipate goals being achieved   DME: None  After Care: HHPT and assist from family as needed, could likely need 24/7 pending progress      Blank Chowdhury, PT, DPT  License [id number]

## 2019-06-24 NOTE — DISCHARGE INSTR - COC
Continuity of Care Form    Patient Name: Pablo Leone   :  1945  MRN:  [id number]    Admit date:  2019  Discharge date:  19    Code Status Order: Full Code   Advance Directives:   [facility] Documentation     Date/Time Healthcare Directive Type of Healthcare Directive Copy in [address] [facility] [address] Agent's Name Healthcare Agent's Phone Number    [phone]  Yes, patient has an advance directive for healthcare treatment  Durable power of  for health care;Living will  No, copy requested from family  --  --  --          Admitting Physician:  Jac Arreola MD  PCP: No primary care provider on file. Discharging Nurse: Santana CASH RN  [address] Unit/Room#: 1764/7141-S  Discharging Unit Phone Number: [phone]    Emergency Contact:   Extended Emergency Contact Information  Primary Emergency Contact: rose lopez  Home Phone: [phone]  Relation: Child  Secondary Emergency Contact: marky lopez  Home Phone: [phone]  Relation: Child    Past Surgical History:  History reviewed. No pertinent surgical history. Immunization History: There is no immunization history on file for this patient. Active Problems:  Patient Active Problem List   Diagnosis Code    Traumatic brain injury of unknown intent ([facility] [address] S06. 9X9A    Moderate protein-calorie malnutrition (HCC) E44.0       Isolation/Infection:   Isolation          No Isolation            Nurse Assessment:  Last Vital Signs: /60   Pulse 88   Temp 98.7 °F (37.1 °C) (Temporal)   Resp 16   Ht 5' 3\" (1.6 m)   Wt 151 lb 11.2 oz (68.8 kg)   SpO2 94%   BMI 26.87 kg/m²     Last documented pain score (0-10 scale): Pain Level: 7  Last Weight:   Wt Readings from Last 1 Encounters:   19 151 lb 11.2 oz (68.8 kg)     Mental Status:  oriented, alert, logical and thought processes intact    IV Access:  - None    Nursing Mobility/ADLs:  Walking   Assisted  Transfer Assisted  Bathing             Assisted  Dressing  Assisted  Toileting  Assisted  Feeding  Independent  Med Admin  Assisted  Med Delivery   whole    Wound Care Documentation and Therapy:  Wound 06/14/19 Elbow Right (Active)   Dressing Status Clean;Dry; Intact 6/24/2019  2:42 AM   Dressing Changed Changed/New 6/20/2019  7:51 PM   Dressing/Treatment Non adherent;Dry Dressing 6/23/2019  4:30 PM   Wound Cleansed Rinsed/Irrigated with saline 6/20/2019  7:51 PM   Dressing Change Due 06/26/19 6/24/2019  2:42 AM   Wound Length (cm) 3 cm 6/21/2019  9:12 AM   Wound Width (cm) 2 cm 6/21/2019  9:12 AM   Wound Depth (cm) 0.1 cm 6/14/2019  2:00 PM   Wound Surface Area (cm^2) 6 cm^2 6/21/2019  9:12 AM   Change in Wound Size % (l*w) 33.33 6/21/2019  9:12 AM   Wound Volume (cm^3) 0.9 cm^3 6/14/2019  2:00 PM   Wound Assessment RIGOBERTO 6/24/2019  2:42 AM   Drainage Amount Scant 6/21/2019  9:12 AM   Drainage Description Serosanguinous 6/21/2019  9:12 AM   Odor None 6/15/2019  2:10 PM   Anna-wound Assessment RIGOBERTO 6/24/2019  2:42 AM   Non-staged Wound Description Partial thickness 6/19/2019  5:15 AM   Black%Wound Bed 100 6/21/2019  9:12 AM   Culture Taken No 6/15/2019  1:45 AM   Number of days: 9        Elimination:  Continence:   · Bowel: Yes  · Bladder: Yes  Urinary Catheter: None   Colostomy/Ileostomy/Ileal Conduit: No       Date of Last BM: 6/22/19    Intake/Output Summary (Last 24 hours) at 6/24/2019 0833  Last data filed at 6/24/2019 0740  Gross per 24 hour   Intake 700 ml   Output 1400 ml   Net -700 ml     I/O last 3 completed shifts: In: 0 [P.O.:1380; I.V.:10]  Out: 1200 [Urine:1200]    Safety Concerns:     History of Falls (last 30 days) and At Risk for Falls    Impairments/Disabilities:      Vision and Hearing    Nutrition Therapy:  Current Nutrition Therapy:   - Oral Diet:  General and fluid restriction 1500cc/24hr/ oral supplement BID    Routes of Feeding: Oral  Liquids:  Thin Liquids  Daily Fluid Restriction: yes - amount 1500  Last Modified Barium Swallow with Video (Video Swallowing Test): not done    Treatments at the Time of Hospital Discharge:   Respiratory Treatments:   Oxygen Therapy:  is on oxygen at 3 L/min per nasal cannula. Ventilator:    - No ventilator support    Rehab Therapies: Physical Therapy, Occupational Therapy and Speech/Language Therapy  Weight Bearing Status/Restrictions: No weight bearing restirctions  Other Medical Equipment (for information only, NOT a DME order):  wheelchair and walker  Other Treatments:     cbc with diff twice weekly on Monday and thursday while in subacute rehab with results faxed to Dr. Valentin Irizarry office. Also make appointment for her to see him in office [phone] (office hours 8am-4:30pm)     Patient's personal belongings (please select all that are sent with patient):  Glasses, dentures, clothers    RN SIGNATURE:  Electronically signed by Gideon Gaona RN on 6/25/19 at 10:37 AM    CASE MANAGEMENT/SOCIAL WORK SECTION    Inpatient Status Date: 6/12/19    Readmission Risk Assessment Score:  Readmission Risk              Risk of Unplanned Readmission:        14           Discharging to Facility/ Agency   · Name: [facility]  · Address:[address] [place] [address]  · Phone:[phone]  · Fax:[phone]      / signature: Linda Espinal      PHYSICIAN SECTION    Prognosis: Good    Condition at Discharge: Stable    Rehab Potential (if transferring to Rehab): Good    Recommended Labs or Other Treatments After Discharge: PT. OT, speech    Physician Certification: I certify the above information and [facility]  is necessary for the continuing treatment of the diagnosis listed and that she requires [facility] for less 30 days.      Update Admission H&P: No change in H&P    PHYSICIAN SIGNATURE:  Electronically signed by Ramon Perry MD on 6/24/19 at 8:33 AM

## 2019-06-24 NOTE — PROGRESS NOTES
Occupational Therapy  OCCUPATIONAL THERAPY DAILY NOTE    Date:2019   Patient Name: Shashank Nunez  MRN: [id number]  : 1945  Room: [address] [place] [address]     Diagnosis: S/P Fall with SDH, Lacerations to Head and R Elbow     Precautions: falls, O2 3 liters     Functional Assessment:   Date Status AE  Comments   Feeding 19  Minimal Assist      Grooming 19 CGA/Min A     Bathing 19  UB CGA/min assist  LB Moderate Assist        UB Dressing 19  Set-up  Set up to doff sweater while seated EOB. LB Dressing 19  Min A   To don pants while seated EOB and standing to pull over hips. Homemaking 19  CGA        Functional Transfers / Balance:   Date Status DME  Comments   Sit Balance 19  Supervision   EOB     Stand Balance 19  SBA  w/w     [] Tub  [x] Shower   Transfer 19  Min assist Shower chair  Grab bars    Commode   Transfer    toileting  19   SBA       Max  Assist  Grab rail, w/w        Pt Min A for clothing management standing and Max A for hygiene following BM. Functional   Mobility 19   SBA    rollator Short home distances   Other:  Sit<>stand         W/c to bed     19  SBA        CGA         HHA      Functional Exercises / Activity:    Sensory / Neuromuscular Re-Education:      Cognitive Skills:   Status Comments   Problem   Solving fair   demo during ADL;s and transfers   Memory fair  \"   Sequencing fair  \"   Safety fair  \"     Visual Perception:    Education:  Pt was educated on compensatory techs during LB dressing to increase independence. [] Family teach completed on:    19: daughter present but did not participate in ADL. Pain Level: 0/10 just fatigues easily.      Additional Notes:   19: requires motivation to participate in ADLs and self challenge   19 pt needed frequent rest breaks during tx session on this date due to level of fatigue   19 Nurse notified in regards to patient having loose bowel episode this am during ADL's    Patient has made fair  progress during treatment sessions toward set goals. Therapy emphasis to obtain goals:Current Treatment Recommendations: Strengthening, Gait Training, Safety Education & Training, Balance Training, Patient/Caregiver Education & Training, Self-Care / ADL, Functional Mobility Training, Neuromuscular Re-education, Equipment Evaluation, Education, & procurement, Home Management Training, Endurance Training, Cognitive Reorientation       [x] Continue with current OT Plan of care. [] Prepare for Discharge    DISCHARGE RECOMMENDATIONS  Recommended DME:    Post Discharge Care:   []Home Independently  []Home with 24hr Care / Supervision [x]Home with Partial Supervision []Home with Home Health OT []Home with Out Pt OT []Other: ___   Comments:         Time in Time out Tx Time Breakdown  Variance:   First Session  [phone] [x] Individual Tx- 30 mins  [] Concurrent Tx -  [] Co-Tx -   [] Group Tx -   [] Time Missed -              Second Session   [] Individual Tx-   [] Concurrent Tx -   [] Co-Tx -   [] Group Tx -   [] Time Missed -                    Third Session    [] Individual Tx-   [] Concurrent Tx -  [] Co-Tx -   [] Group Tx -   [] Time Missed -         Total Tx Time 30 mins     Norman Friend, GRANT/L [id number]  I have read the above note and agree with the documentation.   Kerry Dewey OTR/L [id number]

## 2019-06-24 NOTE — PROGRESS NOTES
Physical Therapy    Facility/Department: [facility] RE[facility]  Treatment Note    NAME: Lianne Rubin  : 1945  MRN: [id number]    Date of Service: 2019    Evaluating Therapist: Greg aCrdoso DPT    ROOM: [address] [place], [place] [id number]-  DIAGNOSIS: TBI  PMH: Pt found down in residence on 19 with facial wound. CT of head demonstrated L SDH with shift (from assumed fall). L frontal facial laceration s/p repair noted during hospitalization. Intubated upon arrival to hospital. PMH includes myelofibrosis, CHF, AFIB, GERG, pacemaker, CHF. PRECAUTIONS: fluid restriction, falls, dysphagia     Social:  Pt lives alone in a single floor Sr. high rise/independent living facility (3rd floor apartment with elevator access). PTA, pt used a rollator for mobility, w/c, ww, lift chair. Daniel Roe RN 2x a week from [facility] located in [place], [place]     Initial Evaluation  19 AM     PM  Short Term Goals Long Term Goals    Was pt agreeable to Eval/treatment? yes Yes  Yes      Does pt have pain?  C/o mild HA/ R arm pain None  None      Bed Mobility  Rolling: sba  Supine to sit: cg/sba  Sit to supine: Felipe  Scooting: sba Rolling: SBA  Supine to sit: min A for BLE  Sit to supine: min A  Scooting: SBA NT sup Mod I   Transfers Sit to stand: cgA  Stand to sit: cgA  Stand pivot: cgA with ww Sit to stand: SBA  Stand to sit: SBA  Stand pivot: SBA with ww   Sit to stand: SBA  Stand to sit: SBA  Stand pivot: SBA with ww   sba with AAD  sup with AAD   Ambulation    30 feet with ww with cg/sba 60 feet with ww with SBA 50 feet with ww with  feet with AAD with sup  150 feet with AAD with  sup    Walking 10 feet on uneven surface NT NT NT     Wheel Chair Mobility NT NT NT     Car Transfers NT NT Min A for BLE sup  sup    Stair negotiation: ascended and descended  1 steps with bilateral rail with Felipe NT 4 steps with 2 rails with CGA 4 steps with bilateral rail with sba 4 steps with bilateral rail with  Felipe   Curb Step:   ascended and descended 4 inch step with ww and Felipe NT NT 4 inch step with AAD and sba 4 inch step with AAD and  Felipe   Picking up object off the floor NT       BLE ROM WFl       BLE Strength Bilateral Hips 3+/5, knees/ankles 4/5  Bilateral Hips 3+/5, knees/ankles 4/5     Balance  Sitting: sup  Standing: cgA with ww  Sitting: sup  Standing: SBA with ww     Date Family Teach Completed TBA (dtr present on eval)       Is additional Family Teaching Needed? Y or N Y       Hindering Progress Balance/neuropathy, endurance/activity tolerance       PT recommended ELOS 7-10 days       Team's Discharge Plan        Therapist at Team Meeting          AM  Heel slides 10 reps  SAQ 10 reps  PM  SPT x4 reps with ww with SBA      Patient education  Pt was educated on bed mobility, PLB, stair negotiation, car transfer    Patient response to education:   Pt verbalized understanding Pt demonstrated skill Pt requires further education in this area   x x      Additional Comments:   Pt's spO2 98% on RA prior to mobility. Pt required encouragement to increase activity. Pt self limiting with activity. AM  Time in: 1000  Time out: 1045    PM  Time in: 1515  Time out: 1600    Pt is making limited progress toward established Physical Therapy goals. Continue with physical therapy current plan of care.     Edmar Bo PTA [id number]

## 2019-06-24 NOTE — PROGRESS NOTES
Attempt to see pt. Pt declined therapy due to being extremely tired.      [facility]  Speech Language Pathologist Student Intern

## 2019-06-24 NOTE — CARE COORDINATION
Plan transfer to [facility] on 6/25/19 @ 1pm if ok with physician. [phone] [facility]L oxygen. Pt. And daughter aware and in agreement.     Arielle Neves  6/24/2019

## 2019-06-24 NOTE — PROGRESS NOTES
Liss Arteaga is a 68 y.o. female patient.     Current Facility-Administered Medications   Medication Dose Route Frequency Provider Last Rate Last Dose    fluconazole (DIFLUCAN) tablet 200 mg  200 mg Oral Daily Echo Turner MD        loperamide (IMODIUM) capsule 2 mg  2 mg Oral TID PRN Bhavik Vázquez MD        0.9 % sodium chloride bolus  250 mL Intravenous Once Echo Turner MD        ipratropium-albuterol (DUONEB) nebulizer solution 1 ampule  1 ampule Inhalation Q4H WA Echo Turner MD   1 ampule at 06/22/19 0915    bumetanide (BUMEX) tablet 2 mg  2 mg Oral BID Echo Turner MD   2 mg at 06/23/19 1723    carbamide peroxide (DEBROX) 6.5 % otic solution 5 drop  5 drop Both Ears BID Echo Turner MD   5 drop at 06/22/19 0838    sodium chloride flush 0.9 % injection 10 mL  10 mL Intravenous BID Bhavik Vázquez MD   10 mL at 06/23/19 0816    sodium chloride flush 0.9 % injection 10 mL  10 mL Intravenous PRN Echo Turner MD        diltiazem (CARDIZEM CD) extended release capsule 180 mg  180 mg Oral Daily Echo Turner MD   180 mg at 06/23/19 0816    acetaminophen (TYLENOL) tablet 650 mg  650 mg Oral Q4H PRN Echo Turner MD   650 mg at 06/19/19 0144    magnesium hydroxide (MILK OF MAGNESIA) 400 MG/5ML suspension 30 mL  30 mL Oral Daily PRN Echo Turner MD        acetaminophen (TYLENOL) tablet 500 mg  500 mg Oral 4 times per day Echo Turner MD   500 mg at 06/24/19 0714    digoxin (LANOXIN) tablet 125 mcg  125 mcg Oral Daily Echo Turner MD   125 mcg at 06/23/19 0816    ferrous sulfate tablet 325 mg  325 mg Oral Daily with breakfast Echo Turner MD   325 mg at 06/23/19 0816    magnesium oxide (MAG-OX) tablet 400 mg  400 mg Oral Daily Bhavik Vázquez MD   400 mg at 06/14/19 0920    oxyCODONE (ROXICODONE) immediate release tablet 5 mg  5 mg Oral BID Echo Turner MD   5 mg at 06/23/19 2156    pantoprazole (PROTONIX) tablet 40 mg  40 mg Oral RADHA Gutierres MARC Vázquez MD   40 mg at 06/24/19 9434    sodium bicarbonate tablet 650 mg  650 mg Oral BID Giulia Rouse MD   650 mg at 06/23/19 2151    sodium chloride (OCEAN, BABY AYR) 0.65 % nasal spray 1 spray  1 spray Each Nostril 4x Daily PRN Giulia Rouse MD        ruxolitinib phosphate (JAKAFI) 10 MG tablet TABS 10 mg  10 mg Oral BID Giulia Rouse MD   10 mg at 06/23/19 2150     Allergies   Allergen Reactions    Metoprolol Tartrate [Metoprolol]      Active Problems:    Traumatic brain injury of unknown intent ([facility]    Moderate protein-calorie malnutrition ([facility]  Resolved Problems:    * No resolved hospital problems. *    Blood pressure 122/60, pulse 88, temperature 98.7 °F (37.1 °C), temperature source Temporal, resp. rate 16, height 5' 3\" (1.6 m), weight 151 lb 11.2 oz (68.8 kg), SpO2 94 %. Subjective:  Symptoms:  Stable. She reports weakness. Diet:  Adequate intake. Activity level: Impaired due to weakness. Pain:  She reports no pain. Objective:  General Appearance: In no acute distress. Vital signs: (most recent): Blood pressure 122/60, pulse 88, temperature 98.7 °F (37.1 °C), temperature source Temporal, resp. rate 16, height 5' 3\" (1.6 m), weight 151 lb 11.2 oz (68.8 kg), SpO2 94 %. Vital signs are normal.    Output: Producing urine and producing stool. Lungs:  Normal effort and normal respiratory rate. Breath sounds clear to auscultation. Heart: Normal rate. Regular rhythm. S1 normal and S2 normal.    Abdomen: Abdomen is soft. Bowel sounds are normal.   There is no abdominal tenderness. Extremities: Normal range of motion. Neurological: Patient is alert. (4/5 str). Assessment:      Date   Status   AE    Comments     Feeding   6/13/19    Minimal Assist              Grooming   6/21/19   CGA/Min A             Bathing   6/21/19    UB CGA/min assist    LB Moderate Assist               UB Dressing   6/23/19    Set-up       Set up to doff sweater while seated EOB.      LB

## 2019-06-24 NOTE — PROGRESS NOTES
Occupational Therapy  OCCUPATIONAL THERAPY DAILY NOTE    Date:2019  Patient Name: Jennifer Beal  MRN: [id number]  : 1945  Room: [address] [place], [place] [address]     Diagnosis: S/P Fall with SDH, Lacerations to Head and R Elbow     Precautions: falls, O2 3 liters     Functional Assessment:   Date Status AE  Comments   Feeding 19  Set up   Assist to open small packages on tray    Grooming 19 CGA/Min A     Bathing 19  UB CGA/min assist  LB Moderate Assist        UB Dressing 19  Set-up     LB Dressing 19  Min A     Homemaking 19  CGA   Laundry folding seated at table      Functional Transfers / Balance:   Date Status DME  Comments   Sit Balance 19  Supervision   EOB     Stand Balance 19  SBA  w/w     [] Tub  [x] Shower   Transfer 19  Min assist Shower chair  Grab bars    Commode   Transfer    toileting  19   SBA       Max  Assist  Grab rail, w/w    Functional   Mobility 19 CGA  ww Short distance in pt's room    Other:  Sit<>stand         W/c to bed     19  SBA        CGA         HHA      Functional Exercises / Activity:  AROM exercises to BUE's with towel stretches up incline wedge 3 sets 10 reps in all planes   BUE strength exercises: goyo box with 5# wt on table top surface 3 sets 10 reps in all planes                                            2 # weighted ball 3 sets 10 reps in all planes   B hand strength with 5 # digi flex 3 sets 10 reps       Sensory / Neuromuscular Re-Education:      Cognitive Skills:   Status Comments   Problem   Solving fair   demo during ADL;s and transfers   Memory fair  \"   Sequencing fair  \"   Safety fair  \"     Visual Perception:    Education:    Pt was educated on pacing and energy conservation techniques     [] Family teach completed on:    19: daughter present but did not participate in ADL. Pain Level: 0/10 just fatigues easily.      Additional Notes:   19: requires motivation to participate in ADLs and self challenge   6/20/19 pt needed frequent rest breaks during tx session on this date due to level of fatigue   6/21/19 Nurse notified in regards to patient having loose bowel episode this am during ADL's    Patient has made fair  progress during treatment sessions toward set goals. Therapy emphasis to obtain goals:Current Treatment Recommendations: Strengthening, Gait Training, Safety Education & Training, Balance Training, Patient/Caregiver Education & Training, Self-Care / ADL, Functional Mobility Training, Neuromuscular Re-education, Equipment Evaluation, Education, & procurement, Home Management Training, Endurance Training, Cognitive Reorientation       [x] Continue with current OT Plan of care.   [] Prepare for Discharge    DISCHARGE RECOMMENDATIONS  Recommended DME:    Post Discharge Care:   []Home Independently  []Home with 24hr Care / Supervision [x]Home with Partial Supervision []Home with Home Health OT []Home with Out Pt OT []Other: ___   Comments:         Time in Time out Tx Time Breakdown  Variance:   First Session  10:45 11:30  [] Individual Tx-   [x] Concurrent Tx -45  [] Co-Tx -   [] Group Tx -   [] Time Missed -              Second Session 2:30  3:15 [x] Individual Tx- 45  [] Concurrent Tx -   [] Co-Tx -   [] Group Tx -   [] Time Missed -                    Third Session    [] Individual Tx-   [] Concurrent Tx -  [] Co-Tx -   [] Group Tx -   [] Time Missed -         Total Tx Time: 90 mins    Goldy Rangel OTR/L [id number] [Shortness Of Breath] : shortness of breath [Chest Pain] : chest pain [Negative] : Heme/Lymph

## 2019-06-25 VITALS
HEIGHT: 63 IN | BODY MASS INDEX: 26.88 KG/M2 | RESPIRATION RATE: 15 BRPM | HEART RATE: 82 BPM | DIASTOLIC BLOOD PRESSURE: 75 MMHG | WEIGHT: 151.7 LBS | SYSTOLIC BLOOD PRESSURE: 121 MMHG | TEMPERATURE: 98.2 F | OXYGEN SATURATION: 98 %

## 2019-06-25 LAB — BLOOD CULTURE, ROUTINE: NORMAL

## 2019-06-25 PROCEDURE — 2700000000 HC OXYGEN THERAPY PER DAY

## 2019-06-25 PROCEDURE — 97110 THERAPEUTIC EXERCISES: CPT

## 2019-06-25 PROCEDURE — 97530 THERAPEUTIC ACTIVITIES: CPT

## 2019-06-25 PROCEDURE — 6370000000 HC RX 637 (ALT 250 FOR IP): Performed by: PHYSICAL MEDICINE & REHABILITATION

## 2019-06-25 RX ORDER — OXYCODONE HYDROCHLORIDE 5 MG/1
5 CAPSULE ORAL 2 TIMES DAILY
Qty: 14 CAPSULE | Refills: 0 | Status: SHIPPED | OUTPATIENT
Start: 2019-06-25 | End: 2019-07-02

## 2019-06-25 RX ORDER — DILTIAZEM HYDROCHLORIDE 180 MG/1
180 CAPSULE, COATED, EXTENDED RELEASE ORAL DAILY
Qty: 30 CAPSULE | Refills: 3 | Status: SHIPPED | OUTPATIENT
Start: 2019-06-26

## 2019-06-25 RX ORDER — LANOLIN ALCOHOL/MO/W.PET/CERES
400 CREAM (GRAM) TOPICAL DAILY
Qty: 30 TABLET | Refills: 0 | Status: SHIPPED | OUTPATIENT
Start: 2019-06-26

## 2019-06-25 RX ORDER — ASCORBIC ACID 500 MG
500 TABLET ORAL DAILY
Qty: 30 TABLET | Refills: 3 | Status: SHIPPED | OUTPATIENT
Start: 2019-06-25 | End: 2019-07-03

## 2019-06-25 RX ORDER — LOPERAMIDE HYDROCHLORIDE 2 MG/1
2 CAPSULE ORAL 3 TIMES DAILY PRN
Qty: 30 CAPSULE | Refills: 0 | Status: SHIPPED | OUTPATIENT
Start: 2019-06-25 | End: 2019-07-05

## 2019-06-25 RX ADMIN — DILTIAZEM HYDROCHLORIDE 180 MG: 180 CAPSULE, EXTENDED RELEASE ORAL at 08:47

## 2019-06-25 RX ADMIN — OXYCODONE HYDROCHLORIDE 5 MG: 5 TABLET ORAL at 08:41

## 2019-06-25 RX ADMIN — FLUCONAZOLE 200 MG: 100 TABLET ORAL at 08:41

## 2019-06-25 RX ADMIN — FERROUS SULFATE TAB 325 MG (65 MG ELEMENTAL FE) 325 MG: 325 (65 FE) TAB at 08:41

## 2019-06-25 RX ADMIN — BUMETANIDE 2 MG: 1 TABLET ORAL at 08:42

## 2019-06-25 RX ADMIN — DIGOXIN 125 MCG: 125 TABLET ORAL at 08:42

## 2019-06-25 ASSESSMENT — PAIN DESCRIPTION - PAIN TYPE: TYPE: CHRONIC PAIN

## 2019-06-25 ASSESSMENT — PAIN SCALES - GENERAL
PAINLEVEL_OUTOF10: 0
PAINLEVEL_OUTOF10: 0
PAINLEVEL_OUTOF10: 1
PAINLEVEL_OUTOF10: 0
PAINLEVEL_OUTOF10: 6
PAINLEVEL_OUTOF10: 8

## 2019-06-25 ASSESSMENT — PAIN DESCRIPTION - DESCRIPTORS: DESCRIPTORS: ACHING;DISCOMFORT

## 2019-06-25 ASSESSMENT — PAIN DESCRIPTION - FREQUENCY: FREQUENCY: INTERMITTENT

## 2019-06-25 ASSESSMENT — PAIN - FUNCTIONAL ASSESSMENT: PAIN_FUNCTIONAL_ASSESSMENT: ACTIVITIES ARE NOT PREVENTED

## 2019-06-25 ASSESSMENT — PAIN DESCRIPTION - LOCATION: LOCATION: ARM;BACK

## 2019-06-25 NOTE — PROGRESS NOTES
4/5 Bilateral Hips 3+/5, knees/ankles 4/5     Balance  Sitting: sup  Standing: cgA with ww Sitting: Independent   Standing: sba with ww     Date Family Teach Completed TBA (dtr present on eval) Dtr present most sessions     Is additional Family Teaching Needed? Y or N Y      Hindering Progress Balance/neuropathy, endurance/activity tolerance      PT recommended ELOS 7-10 days      Team's Discharge Plan       Therapist at Team Meeting         Pt made limited progress during POC while on acute rehab unit towards all long term goals. Pt limited by fatigue (low Hgb) as well self limiting with participation. Pt to d/c to SNF this date ([facility]) for continued rehabilitation and medical management.      REMEDIOS RodriguezT  [id number]

## 2019-06-25 NOTE — PROGRESS NOTES
Physical Therapy    Facility/Department: [address] REHAB  Treatment Note    NAME: Candido Person  : 1945  MRN: [id number]    Date of Service: 2019    Evaluating Therapist: Meng Mccracken DPT    ROOM: [id number]  DIAGNOSIS: TBI  PMH: Pt found down in residence on 19 with facial wound. CT of head demonstrated L SDH with shift (from assumed fall). L frontal facial laceration s/p repair noted during hospitalization. Intubated upon arrival to hospital. PMH includes myelofibrosis, CHF, AFIB, GERG, pacemaker, CHF. PRECAUTIONS: fluid restriction, falls, dysphagia     Social:  Pt lives alone in a single floor Sr. high rise/independent living facility (3rd floor apartment with elevator access). PTA, pt used a rollator for mobility, w/c, ww, lift chair. [facility] RN 2x a week from [facility] located in [place], [place]     Initial Evaluation  19 AM     PM  Short Term Goals Long Term Goals    Was pt agreeable to Eval/treatment? yes Yes  Yes      Does pt have pain?  C/o mild HA/ R arm pain None  None      Bed Mobility  Rolling: sba  Supine to sit: cg/sba  Sit to supine: Felipe  Scooting: sba Rolling: SBA  Supine to sit: min A for LE management   Sit to supine: min A  Scooting: SBA NT sup Mod I   Transfers Sit to stand: cgA  Stand to sit: cgA  Stand pivot: cgA with ww Sit to stand: SBA  Stand to sit: SBA  Stand pivot: SBA with ww   Sit to stand: SBA  Stand to sit: SBA  Stand pivot: SBA with ww   sba with AAD  sup with AAD   Ambulation    30 feet with ww with cg/sba 75 feet with ww with SBA 50 feet with ww with  feet with AAD with sup  150 feet with AAD with  sup    Walking 10 feet on uneven surface NT 10 feet with ww with CG/SBA NT     Wheel Chair Mobility NT NT NT     Car Transfers NT NT Min A for BLE sup  sup    Stair negotiation: ascended and descended  1 steps with bilateral rail with Felipe 4 steps with bilateral rails with min A 4 steps with 2 rails with CGA 4 steps with bilateral rail with sba 4 steps with bilateral rail with  Felipe   Curb Step:   ascended and descended 4 inch step with ww and Felipe 4 inch step with ww with min A NT 4 inch step with AAD and sba 4 inch step with AAD and  Felipe   Picking up object off the floor NT       BLE ROM WFl       BLE Strength Bilateral Hips 3+/5, knees/ankles 4/5  Bilateral Hips 3+/5, knees/ankles 4/5     Balance  Sitting: sup  Standing: cgA with ww  Sitting: sup  Standing: SBA with ww     Date Family Teach Completed TBA (dtr present on eval)       Is additional Family Teaching Needed? Y or N Y       Hindering Progress Balance/neuropathy, endurance/activity tolerance       PT recommended ELOS 7-10 days       Team's Discharge Plan        Therapist at Team Meeting              Patient education  Pt was educated on sequencing/technqiue with stair negotiation     Patient response to education:   Pt verbalized understanding Pt demonstrated skill Pt requires further education in this area   x X  x     Additional Comments:   Increased time required with activity. Encouragement required throughout treatment. Pt is self limiting. AM  Time in: 1000  Time out: 1045    PM  Time in: 1515  Time out: 1600    Pt is making limited progress toward established Physical Therapy goals. Continue with physical therapy current plan of care.     Zhanna Merritt [id number]

## 2019-06-25 NOTE — PROGRESS NOTES
Occupational Therapy  OCCUPATIONAL THERAPY DAILY NOTE/DISCHARGE SUMMARY     Date:2019  Patient Name: Jett Williamson  MRN: [id number]  : 1945  Room: [address] [place] [address]     Diagnosis: S/P Fall with SDH, Lacerations to Head and R Elbow     Precautions: falls, O2 3 liters     Functional Assessment:   Date Status AE  Comments   Feeding 19  Set up      Grooming 19 CGA/Min A     Bathing 19  UB CGA/min assist  LB Moderate Assist      UB Dressing 19  Set-up     LB Dressing 19  Min A     Homemaking 19  CGA        Functional Transfers / Balance:   Date Status DME  Comments   Sit Balance 19  Supervision      Stand Balance 19  SBA  w/w    [] Tub  [x] Shower   Transfer 19  Min assist Shower chair  Grab bars    Commode   Transfer    toileting  19   SBA       Max  Assist  Grab rail, w/w    Functional   Mobility 19 CGA  ww    Other:  Sit<>stand         W/c to bed     19  SBA        CGA         HHA      Functional Exercises / Activity:   BUE ROM and strength exercises with wheel and medium resistive theraputty on table top surface   B hand and gross motor coordination with graded clothespin activity   B hand strength with 5 # digi flex 3 sets 10 reps       Sensory / Neuromuscular Re-Education:      Cognitive Skills:   Status Comments   Problem   Solving fair   demo during ADL;s and transfers   Memory fair  \"   Sequencing fair  \"   Safety fair  \"     Visual Perception:    Education:      [] Family teach completed on:    19: daughter present but did not participate in ADL. Pain Level: 0/10 just fatigues easily.      Additional Notes:   19: requires motivation to participate in ADLs and self challenge   19 pt needed frequent rest breaks during tx session on this date due to level of fatigue   19 Nurse notified in regards to patient having loose bowel episode this am during ADL's    Patient has made fair  progress during training [x]Energy Conservation [x]Home / Kitchen Safety  [x]Fall Prevention  []Other:    Family training was completed: yes    Recommendations: Continue OT at SNF         Post Discharge Care:   []Home Independently  []Home with 24hr Care / Supervision [x]Home with Partial Supervision []Home with Home Health OT []Home with Out Pt OT []Other: ___   Comments:         Time in Time out Tx Time Breakdown  Variance:   First Session  10:45 11:30  [] Individual Tx-   [x] Concurrent Tx -45  [] Co-Tx -   [] Group Tx -   [] Time Missed -              Second Session   [] Individual Tx-  [] Concurrent Tx -   [] Co-Tx -   [] Group Tx -   [] Time Missed -                    Third Session    [] Individual Tx-   [] Concurrent Tx -  [] Co-Tx -   [] Group Tx -   [] Time Missed -         Total Tx Time: [facility] OTR/L [id number]

## 2019-06-25 NOTE — PATIENT CARE CONFERENCE
[address] [place], [place] [address] [facility] ACUTE REHABILITATION  TEAM CONFERENCE NOTE/PATIENT PLAN OF CARE    Date: 2019  Admission date: 2019  Patient Name: Brenda Florence        MRN: [id number]    : 1945  (74 y.o.)  Gender: female   Rehab diagnosis/surgery with date:  TBI 19  Impairment Group Code:  2.22      MEDICAL/FUNCTIONAL HISTORY/STATUS: Hematology/oncology following and recommend giving blood if hgb <7  Positive for candida in bladder, given 3 days of diflucan  Wound care consulted for right elbow eschar and dry dressing ordered.     Consultations/Labs/X-rays: h/h on 19 was 6.8/21.4, patient received blood and current h/h is 7.6/23.6  WBCs elevated but down trending, currently 17.2      MEDICATION UPDATE:  Given 3 days of diflucan for bladder infection  Edema in BLE, on bumex      NURSING FIMS:    Bowel:   Current level: independent  Short term bowel goal:  Independent  Long term bowel goal: Independent    Bladder:   Current level: independent  Short term bladder goal: Independent  Long term bladder goal: independent    Toilet Hygiene:   Current level : Modified independent  Short term Toilet hygiene goal: Modified Independent  Long term toilet hygiene goal:  Modified independent    Skin integrity: right elbow eschar, dry dressing  Pain: chronic back pain, controlled with roxicodone    NUTRITION    Diet  general , 1500 cc fluid restriction   Liquid consistency   thin    SOCIAL INFORMATION:  Lives with: alone  Prior community services:  none  Home Architecture:  3rd floor apt, elevator, had home health  Prior Level of function:  independent, driving, occasionally  used wheeled walker, used a rollator  DME:  rollator, wheelchair, shower chair, elevated toilet seat, lift chair and step stool       FAMILY / PATIENT EDUCATION:  safety and education has been ongoing, daughter has been here daily      PHYSICAL THERAPY    Bed mobility:   Current level: standby assist-min assist  Short term bed transfer goal: Modified Independent  Long term toilet transfer goal: Modified independent      SPEECH THERAPY  Swallowing:  Current level:  Modified independent  Short term swallowing goal: Modified Independent  Long term swallowing Goal: Modified independent    Comprehension:   Current level: supervision  Short term comprehension goal: Modified Independent  Long term comprehension goal: Modified independent    Expression:   Current level: supervision  Short term expression goal: Modified Independent  Long term expression goal: Modified independent    Problem solving:   Current level: Minimum assistance to Moderate Assist  Short term problem solving goal: Supervision  Long term problem solving goal: supervision    Memory:  Current level: mod assist-min assist  Short term memory goal: Supervision  Long term memory goal: supervision    Social interaction:  supervision, goal is Modified Independent     Safety awareness: fair      Patient/family's personal goals: improve endurance, safety and mobility  Factors supporting goal achievement:  Family support  Factors hindering goal achievement:  Plan for SNF           Discharge Plan   Estimated Length of Stay: 6/25            Destination: [facility]  Services at Discharge: none  Equipment at Discharge: none      INTERDISCIPLINARY TEAM/PHYSICIAN [address]:  Pt is planning to leave for SNF today      I approve the established interdisciplinary plan of care as documented within the medical record of [facility]. Please see team conference signature page for those in attendance.     Electronically signed by Georgina Chavez RN on 6/25/2019 at 9:47 AM

## 2019-06-25 NOTE — PROGRESS NOTES
Speech Language Pathology  ACUTE REHABILITATION -- DISCHARGE SUMMARY                 SWALLOWING:  Current level: MODIFIED INDEPENDENT    Diet: Regular consistency solids and thin consistency liquids      LANGUAGE:        RECEPTIVE:  Current FIM level: MODIFIED INDEPENDENT      Short term FIM goal:        Long term FIM goal: Met         EXPRESSIVE:   Current FIM level: MODIFIED INDEPENDENT      Short term FIM goal:       Long term FIM goal:  Met      COGNITION       PROBLEM SOLVING: Current FIM level: MIN ASSISTANCE      Short term FIM goal:       Long term FIM goal: SUPERVISION         MEMORY:    Current FIM level: MIN ASSISTANCE      Short term FIM goal:        Long term FIM goal:  SUPERVISION       SAFETY/INSIGHT:  Fair    SPEECH INTELLIGIBILITY:  Good intelligibility for conversational speech. Speech Pathologist (SLP) completed education with the patient and/or family regarding type of cognitive impairment. Discussed compensatory strategies to assist in improving attention, STM/LTM, problem solving, abstract reasoning and safety/insight. Encouraged patient and/or family to engage SLP in structured Q&A session relative to identified deficit areas. Patient and/or family indicated understanding of all information provided via satisfactory verbal response. Progress made toward goals. Recommend continued SP intervention.       Denys Quezada M.A., [facility]-SLP/L  SP [id number]  6/25/2019

## 2019-06-26 NOTE — DISCHARGE SUMMARY
MD    D: 06/25/2019 8:38:24       T: 06/25/2019 8:43:49     [id number]  Job#: [id number]     Doc#: [id number]    CC:

## 2019-07-03 NOTE — PROGRESS NOTES
Occupational Therapy  DISCHARGE SUMMARY    Group interaction skills/socialization:  Maame Farley displayed social interaction skills at the minimal direction. Leisure participation/awareness:  Maame Farley participated in 4 therapeutic recreation interventions identifying 4 benefits to leisure participation.     Other:    Outcomes: goals partially achieved      Electronically signed by Conner Fernandez on 7/3/2019 at 8:28 AM